# Patient Record
Sex: FEMALE | Race: WHITE | Employment: OTHER | URBAN - METROPOLITAN AREA
[De-identification: names, ages, dates, MRNs, and addresses within clinical notes are randomized per-mention and may not be internally consistent; named-entity substitution may affect disease eponyms.]

---

## 2017-03-16 RX ORDER — ATENOLOL 50 MG/1
50 TABLET ORAL DAILY
Qty: 90 TAB | Refills: 0 | Status: SHIPPED | OUTPATIENT
Start: 2017-03-16 | End: 2017-07-01 | Stop reason: SDUPTHER

## 2017-03-16 RX ORDER — AMLODIPINE BESYLATE 10 MG/1
10 TABLET ORAL DAILY
Qty: 90 TAB | Refills: 0 | Status: SHIPPED | OUTPATIENT
Start: 2017-03-16 | End: 2017-07-01 | Stop reason: SDUPTHER

## 2017-03-16 RX ORDER — ALLOPURINOL 300 MG/1
300 TABLET ORAL DAILY
Qty: 90 TAB | Refills: 0 | Status: SHIPPED | OUTPATIENT
Start: 2017-03-16 | End: 2017-07-01 | Stop reason: SDUPTHER

## 2017-03-16 RX ORDER — VALSARTAN 320 MG/1
320 TABLET ORAL DAILY
Qty: 90 TAB | Refills: 0 | Status: SHIPPED | OUTPATIENT
Start: 2017-03-16 | End: 2017-07-01 | Stop reason: SDUPTHER

## 2017-03-16 NOTE — TELEPHONE ENCOUNTER
From: Kai Green  To: Roberta Lopez MD  Sent: 3/15/2017 10:04 AM EDT  Subject: Medication Renewal Request    Original authorizing provider: MD Kai Mckeon would like a refill of the following medications:  atenolol (TENORMIN) 50 mg tablet [Reina Ardon MD]  amLODIPine (NORVASC) 10 mg tablet [Reina Ardon MD]  allopurinol (ZYLOPRIM) 300 mg tablet [Reina Ardon MD]  valsartan (DIOVAN) 320 mg tablet Roberta Lopez MD]    Preferred pharmacy: [facility] [phone]    Comment:  My mail pharmacy has changed to : [address] [place] [address] ID [id number] [phone] I do not know what the drug indomethacin listed is for. I have never taken it or been told I need it. Please explain.

## 2017-03-16 NOTE — TELEPHONE ENCOUNTER
Requested Prescriptions     Pending Prescriptions Disp Refills    atenolol (TENORMIN) 50 mg tablet 90 Tab 3     Sig: Take 1 Tab by mouth daily.  amLODIPine (NORVASC) 10 mg tablet 90 Tab 3     Sig: Take 1 Tab by mouth daily.  allopurinol (ZYLOPRIM) 300 mg tablet 90 Tab 3     Sig: Take 1 Tab by mouth daily.  valsartan (DIOVAN) 320 mg tablet 90 Tab 3     Sig: Take 1 Tab by mouth daily.      Last visit 06/27/17  No new appt set

## 2017-03-17 NOTE — TELEPHONE ENCOUNTER
Medication filled for 3 mo. Please advise her to schedule a f/u appt with new provider. (overdue for visit).

## 2017-06-07 ENCOUNTER — HOSPITAL ENCOUNTER (OUTPATIENT)
Dept: LAB | Age: 72
Discharge: HOME | End: 2017-06-07
Payer: MEDICARE

## 2017-06-07 ENCOUNTER — OFFICE VISIT (OUTPATIENT)
Dept: INTERNAL MEDICINE CLINIC | Age: 72
End: 2017-06-07

## 2017-06-07 VITALS
HEIGHT: 62 IN | HEART RATE: 55 BPM | TEMPERATURE: 98.6 F | RESPIRATION RATE: 16 BRPM | WEIGHT: 206 LBS | BODY MASS INDEX: 37.91 KG/M2 | DIASTOLIC BLOOD PRESSURE: 72 MMHG | SYSTOLIC BLOOD PRESSURE: 120 MMHG | OXYGEN SATURATION: 96 %

## 2017-06-07 DIAGNOSIS — M1A.0790 CHRONIC GOUT OF FOOT, UNSPECIFIED CAUSE, UNSPECIFIED LATERALITY: ICD-10-CM

## 2017-06-07 DIAGNOSIS — I10 BENIGN ESSENTIAL HYPERTENSION: ICD-10-CM

## 2017-06-07 DIAGNOSIS — R06.09 DYSPNEA ON EXERTION: Primary | ICD-10-CM

## 2017-06-07 DIAGNOSIS — E66.9 OBESITY (BMI 30-39.9): ICD-10-CM

## 2017-06-07 DIAGNOSIS — Z00.00 WELL ADULT EXAM: ICD-10-CM

## 2017-06-07 DIAGNOSIS — E78.1 HYPERTRIGLYCERIDEMIA: ICD-10-CM

## 2017-06-07 PROCEDURE — 36415 COLL VENOUS BLD VENIPUNCTURE: CPT

## 2017-06-07 PROCEDURE — 85025 COMPLETE CBC W/AUTO DIFF WBC: CPT

## 2017-06-07 PROCEDURE — 81003 URINALYSIS AUTO W/O SCOPE: CPT

## 2017-06-07 PROCEDURE — 80053 COMPREHEN METABOLIC PANEL: CPT

## 2017-06-07 PROCEDURE — 80061 LIPID PANEL: CPT

## 2017-06-07 PROCEDURE — 84550 ASSAY OF BLOOD/URIC ACID: CPT

## 2017-06-07 PROCEDURE — 84443 ASSAY THYROID STIM HORMONE: CPT

## 2017-06-07 NOTE — MR AVS SNAPSHOT
Visit Information Date & Time Provider Department Dept. Phone Encounter #  
 6/7/2017  8:15 AM Aracely Ramirez MD [place] [id number] [facility] [phone] Follow-up Instructions Return in about 4 months (around 10/7/2017) for F/U HTN. Upcoming Health Maintenance Date Due OSTEOPOROSIS SCREENING (DEXA) 12/4/2009 MEDICARE YEARLY EXAM 6/28/2017 INFLUENZA AGE 9 TO ADULT 8/1/2017 BREAST CANCER SCRN MAMMOGRAM 6/17/2018 GLAUCOMA SCREENING Q2Y 3/22/2019 COLONOSCOPY 1/1/2021 DTaP/Tdap/Td series (2 - Td) 1/9/2023 Allergies as of 6/7/2017  Review Complete On: 6/7/2017 By: Aracely Ramirez MD  
  
 Severity Noted Reaction Type Reactions Ace Inhibitors  09/28/2011    Cough Penicillins  10/14/2011    Hives Seafood [Shellfish Containing Products]  10/14/2011    Other (comments) All seafood aggravates gout Shellfish Containing Products  12/05/2011   Side Effect Other (comments)  
 gout Terazosin  09/28/2011    Unknown (comments) Incont Current Immunizations  Reviewed on 1/7/2013 Name Date Influenza Vaccine 10/1/2013 Influenza Vaccine Split 10/7/2012 Pneumococcal Polysaccharide (PPSV-23) 1/7/2013 TD Vaccine 8/1/2002 Tdap 1/9/2013 Zoster Vaccine, Live 11/21/2012 Not reviewed this visit You Were Diagnosed With   
  
 Codes Comments Dyspnea on exertion    -  Primary ICD-10-CM: R06.09 
ICD-9-CM: 786.09 Benign essential hypertension     ICD-10-CM: I10 
ICD-9-CM: 461. 1 Chronic gout of foot, unspecified cause, unspecified laterality     ICD-10-CM: M1A.0790 ICD-9-CM: 274.02 Hypertriglyceridemia     ICD-10-CM: E78.1 ICD-9-CM: 272.1 Obesity (BMI 30-39. 9)     ICD-10-CM: E66.9 ICD-9-CM: 278.00 Well adult exam     ICD-10-CM: Z00.00 ICD-9-CM: V70.0 Vitals BP Pulse Temp Resp Height(growth percentile) Weight(growth percentile) 120/72 (BP 1 Location: Left arm, BP Patient Position: Sitting) (!) 55 98.6 °F (37 °C) (Oral) 16 5' 2\" (1.575 m) 206 lb (93.4 kg) SpO2 BMI OB Status Smoking Status 96% 37.68 kg/m2 Postmenopausal Never Smoker Vitals History BMI and BSA Data Body Mass Index Body Surface Area  
 37.68 kg/m 2 2.02 m 2 Preferred Pharmacy Pharmacy Name Phone [facility] [address] [place] [address] [address] [place] [address] [phone] Your Updated Medication List  
  
   
This list is accurate as of: 6/7/17  8:41 AM.  Always use your most recent med list. ADVIL PO Take  by mouth. Takes occasionally  
  
 allopurinol 300 mg tablet Commonly known as:  Matthew Rodríguez Take 1 Tab by mouth daily. amLODIPine 10 mg tablet Commonly known as:  Eneida Medici Take 1 Tab by mouth daily. atenolol 50 mg tablet Commonly known as:  TENORMIN Take 1 Tab by mouth daily. FISH OIL 1,000 mg Cap Generic drug:  omega-3 fatty acids-vitamin e Take 2 Caps by mouth.  
  
 valsartan 320 mg tablet Commonly known as:  DIOVAN Take 1 Tab by mouth daily. VITAMIN D3 PO Take  by mouth. We Performed the Following CBC WITH AUTOMATED DIFF [41622 CPT(R)] LIPID PANEL [03932 CPT(R)] METABOLIC PANEL, COMPREHENSIVE [90728 CPT(R)] TSH REFLEX TO T4 [[id number] Custom] URIC ACID [id number] CPT(R)] URINALYSIS W/ RFLX MICROSCOPIC [71686 CPT(R)] Follow-up Instructions Return in about 4 months (around 10/7/2017) for F/U HTN. To-Do List   
 06/08/2017 Imaging:  NM CARDIAC SPECT W STRS/REST MULT Patient Instructions Follow a low salt, no concentrated sweets, calorie controlled diet. Stay physically active. Try to lose 15 pounds over the next 4 months. Have a stress test analysis. Have fasting blood analysis today. Continue your current medications. DASH Diet: Care Instructions Your Care Instructions The DASH diet is an eating plan that can help lower your blood pressure. DASH stands for Dietary Approaches to Stop Hypertension. Hypertension is high blood pressure. The DASH diet focuses on eating foods that are high in calcium, potassium, and magnesium. These nutrients can lower blood pressure. The foods that are highest in these nutrients are fruits, vegetables, low-fat dairy products, nuts, seeds, and legumes. But taking calcium, potassium, and magnesium supplements instead of eating foods that are high in those nutrients does not have the same effect. The DASH diet also includes whole grains, fish, and poultry. The DASH diet is one of several lifestyle changes your doctor may recommend to lower your high blood pressure. Your doctor may also want you to decrease the amount of sodium in your diet. Lowering sodium while following the DASH diet can lower blood pressure even further than just the DASH diet alone. Follow-up care is a key part of your treatment and safety. Be sure to make and go to all appointments, and call your doctor if you are having problems. It's also a good idea to know your test results and keep a list of the medicines you take. How can you care for yourself at home? Following the DASH diet · Eat 4 to 5 servings of fruit each day. A serving is 1 medium-sized piece of fruit, ½ cup chopped or canned fruit, 1/4 cup dried fruit, or 4 ounces (½ cup) of fruit juice. Choose fruit more often than fruit juice. · Eat 4 to 5 servings of vegetables each day. A serving is 1 cup of lettuce or raw leafy vegetables, ½ cup of chopped or cooked vegetables, or 4 ounces (½ cup) of vegetable juice. Choose vegetables more often than vegetable juice. · Get 2 to 3 servings of low-fat and fat-free dairy each day. A serving is 8 ounces of milk, 1 cup of yogurt, or 1 ½ ounces of cheese. · Eat 6 to 8 servings of grains each day.  A serving is 1 slice of bread, 1 ounce of dry cereal, or ½ cup of cooked rice, pasta, or cooked cereal. Try to choose whole-grain products as much as possible. · Limit lean meat, poultry, and fish to 2 servings each day. A serving is 3 ounces, about the size of a deck of cards. · Eat 4 to 5 servings of nuts, seeds, and legumes (cooked dried beans, lentils, and split peas) each week. A serving is 1/3 cup of nuts, 2 tablespoons of seeds, or ½ cup of cooked beans or peas. · Limit fats and oils to 2 to 3 servings each day. A serving is 1 teaspoon of vegetable oil or 2 tablespoons of salad dressing. · Limit sweets and added sugars to 5 servings or less a week. A serving is 1 tablespoon jelly or jam, ½ cup sorbet, or 1 cup of lemonade. · Eat less than 2,300 milligrams (mg) of sodium a day. If you limit your sodium to 1,500 mg a day, you can lower your blood pressure even more. Tips for success · Start small. Do not try to make dramatic changes to your diet all at once. You might feel that you are missing out on your favorite foods and then be more likely to not follow the plan. Make small changes, and stick with them. Once those changes become habit, add a few more changes. · Try some of the following: ¨ Make it a goal to eat a fruit or vegetable at every meal and at snacks. This will make it easy to get the recommended amount of fruits and vegetables each day. ¨ Try yogurt topped with fruit and nuts for a snack or healthy dessert. ¨ Add lettuce, tomato, cucumber, and onion to sandwiches. ¨ Combine a ready-made pizza crust with low-fat mozzarella cheese and lots of vegetable toppings. Try using tomatoes, squash, spinach, broccoli, carrots, cauliflower, and onions. ¨ Have a variety of cut-up vegetables with a low-fat dip as an appetizer instead of chips and dip. ¨ Sprinkle sunflower seeds or chopped almonds over salads. Or try adding chopped walnuts or almonds to cooked vegetables. ¨ Try some vegetarian meals using beans and peas. Add garbanzo or kidney beans to salads. Make burritos and tacos with mashed carter beans or black beans. Where can you learn more? Go to http://morenita-elijah.info/. Enter F360 in the search box to learn more about \"DASH Diet: Care Instructions. \" Current as of: March 23, 2016 Content Version: 11.2 © 2791-2172 [employer]. Care instructions adapted under license by [employer] (which disclaims liability or warranty for this information). If you have questions about a medical condition or this instruction, always ask your healthcare professional. [address] any warranty or liability for your use of this information. Introducing [facility]! Dear Nely Herzog: 
Thank you for requesting a Moneytree account. Our records indicate that you already have an active Moneytree account. You can access your account anytime at [email or web address]. Onepager/[employer] Did you know that you can access your hospital and ER discharge instructions at any time in Moneytree? You can also review all of your test results from your hospital stay or ER visit. Additional Information If you have questions, please visit the Frequently Asked Questions section of the Moneytree website at [email or web address] [employer]/. Remember, Moneytree is NOT to be used for urgent needs. For medical emergencies, dial 911. Now available from your iPhone and Android! Please provide this summary of care documentation to your next provider. Your primary care clinician is listed as Anabela Zapien. If you have any questions after today's visit, please call [phone].

## 2017-06-07 NOTE — PROGRESS NOTES
Chief Complaint   [facility]   [facility]     Reviewed record in preparation for visit and have obtained necessary documentation. Identified pt with two pt identifiers(name and ). Health Maintenance Due   Topic    OSTEOPOROSIS SCREENING (DEXA)          Chief Complaint   Patient presents with   [facility] Readings from Last 3 Encounters:   17 206 lb (93.4 kg)   16 214 lb 11.2 oz (97.4 kg)   08/24/15 214 lb (97.1 kg)     Temp Readings from Last 3 Encounters:   17 98.6 °F (37 °C) (Oral)   16 97.1 °F (36.2 °C) (Oral)   08/24/15 96.3 °F (35.7 °C) (Oral)     BP Readings from Last 3 Encounters:   17 120/72   16 120/70   08/24/15 130/70     Pulse Readings from Last 3 Encounters:   17 (!) 55   16 62   08/24/15 (!) 53           Learning Assessment:  :     Learning Assessment 6/3/2015 3/12/2014   PRIMARY LEARNER Patient Patient   HIGHEST LEVEL OF EDUCATION - PRIMARY LEARNER  4 YEARS OF COLLEGE 4 YEARS OF COLLEGE   BARRIERS PRIMARY LEARNER NONE NONE   CO-LEARNER CAREGIVER No No   PRIMARY LANGUAGE ENGLISH ENGLISH    NEED No No   LEARNER PREFERENCE PRIMARY DEMONSTRATION READING   LEARNING SPECIAL TOPICS no no   ANSWERED BY patient patient   RELATIONSHIP SELF SELF       Depression Screening:  :     PHQ over the last two weeks 2017   Little interest or pleasure in doing things Not at all   Feeling down, depressed or hopeless Not at all   Total Score PHQ 2 0       Fall Risk Assessment:  :     Fall Risk Assessment, last 12 mths 2017   Able to walk? Yes   Fall in past 12 months? No       Abuse Screening:  :     Abuse Screening Questionnaire 6/3/2015 3/12/2014   Do you ever feel afraid of your partner? N N   Are you in a relationship with someone who physically or mentally threatens you? N N   Is it safe for you to go home?  Y Y       Coordination of Care Questionnaire:  :     1) Have you been to an emergency room, urgent care clinic since your last visit? no   Hospitalized since your last visit? no             2) Have you seen or consulted any other health care providers outside of [address] [place] [address] since your last visit? no  (Include any pap smears or colon screenings in this section.)    3) Do you have an Advance Directive on file? no    4) Are you interested in receiving information on Advance Directives? NO      Patient is accompanied by self I have received verbal consent from Sukhjinder Gonzalez to discuss any/all medical information while they are present in the room. Reviewed record  In preparation for visit and have obtained necessary documentation.

## 2017-06-07 NOTE — PROGRESS NOTES
Subjective:     Chief Complaint   Patient presents with   [address]     She  is a 66y.o. year old female who presents for evaluation. New patient to me. Has a history of HTN / gout / HLD / hypertriglyceridemia and fasting hyperglycemia. FH:  CAD / breast cancer  Concerns:  · Gets winded / tired with minor exertion      Historical Data    Past Medical History:   Diagnosis Date    Contact dermatitis and other eczema, due to unspecified cause     rosacea; AK's    Gout     HTN (hypertension)     Hypercholesterolemia     Hypertension     Other ill-defined conditions(799.89)     gout    Pre-diabetes         Past Surgical History:   Procedure Laterality Date    ENDOSCOPY, COLON, DIAGNOSTIC      HX COLONOSCOPY  2012    neg - next 2022    HX HEENT  1949    T & A    HX ORTHOPAEDIC  2000    bilateral hammertoe surgery    HX ORTHOPAEDIC  2002    r rotator cuff repair        Outpatient Encounter Prescriptions as of 6/7/2017   Medication Sig Dispense Refill    CHOLECALCIFEROL, VITAMIN D3, (VITAMIN D3 PO) Take  by mouth.  atenolol (TENORMIN) 50 mg tablet Take 1 Tab by mouth daily. 90 Tab 0    amLODIPine (NORVASC) 10 mg tablet Take 1 Tab by mouth daily. 90 Tab 0    allopurinol (ZYLOPRIM) 300 mg tablet Take 1 Tab by mouth daily. 90 Tab 0    valsartan (DIOVAN) 320 mg tablet Take 1 Tab by mouth daily. 90 Tab 0    omega-3 fatty acids-vitamin e (FISH OIL) 1,000 mg cap Take 2 Caps by mouth.  IBUPROFEN (ADVIL PO) Take  by mouth. Takes occasionally       ciprofloxacin HCl (CIPRO) 500 mg tablet Take 1 Tab by mouth two (2) times a day. 6 Tab 0    BIOTIN PO Take  by mouth.  RED YEAST RICE PO Take  by mouth.  indomethacin (INDOCIN) 50 mg capsule Take 1 cap TID as needed for gout flare 30 Cap 0    colchicine 0.6 mg tablet Take 2 tabs at onset of symptoms followed by 1 tab 1 hr later. 90 tablet 0    ACETAMINOPHEN/DP-HYDRAM HCL (TYLENOL PM PO) Take  by mouth.  Takes occasionally        No facility-administered encounter medications on file as of 6/7/2017. Allergies   Allergen Reactions    Ace Inhibitors Cough    Penicillins Hives    Seafood [Shellfish Containing Products] Other (comments)     All seafood aggravates gout    Shellfish Containing Products Other (comments)     gout    Terazosin Unknown (comments)     Incont        Social History     Social History    Marital status:      Spouse name: N/A    Number of children: N/A    Years of education: N/A     Occupational History    Not on file. Social History Main Topics    Smoking status: Never Smoker    Smokeless tobacco: Never Used    Alcohol use No    Drug use: No    Sexual activity: Yes     Partners: Male     Other Topics Concern    Caffeine Concern No     avoids caffeine     Social History Narrative    ** Merged History Encounter **             Review of Systems  A comprehensive review of systems was negative except for that written in the HPI. Objective:     Vitals:    06/07/17 0819   BP: 120/72   Pulse: (!) 55   Resp: 16   Temp: 98.6 °F (37 °C)   TempSrc: Oral   SpO2: 96%   Weight: 206 lb (93.4 kg)   Height: 5' 2\" (1.575 m)     Pleasant female in no acute distress. Neck: Supple. No carotid bruits. Cardiac: RRR without murmurs, gallops or rubs. Lungs: Clear to auscultation. Abdomen: Benign. Neuro: Non focal.  Extremities: 1+ edema LLE > RLE    ASSESSMENT / PLAN:   1. Dyspnea on exertion  · Has sufficient cardiac risk factors to justify stress testing.  - NM CARDIAC SPECT W STRS/REST MULT; Future    2. Benign essential hypertension  · Low salt diet. · Continue atenolol, amlodipine and valsartan. - NM CARDIAC SPECT W STRS/REST MULT; Future  - METABOLIC PANEL, COMPREHENSIVE    3. Chronic gout of foot, unspecified cause, unspecified laterality  · Continue allopurinol.  - URIC ACID    4. Hypertriglyceridemia  · Continue fish oil.  - NM CARDIAC SPECT W STRS/REST MULT; Future    5. Obesity (BMI 30-39. 9)  I have reviewed/discussed the above normal BMI with the patient. I have recommended the following interventions: dietary management education, guidance, and counseling . [facility] 6. Well adult exam    - CBC WITH AUTOMATED DIFF  - LIPID PANEL  - TSH REFLEX TO T4  - METABOLIC PANEL, COMPREHENSIVE  - URINALYSIS W/ RFLX MICROSCOPIC  - URIC ACID    Patient Instructions   Follow a low salt, no concentrated sweets, calorie controlled diet. Stay physically active. Try to lose 15 pounds over the next 4 months. Have a stress test analysis. Have fasting blood analysis today. Continue your current medications. DASH Diet: Care Instructions  Your Care Instructions  The DASH diet is an eating plan that can help lower your blood pressure. DASH stands for Dietary Approaches to Stop Hypertension. Hypertension is high blood pressure. The DASH diet focuses on eating foods that are high in calcium, potassium, and magnesium. These nutrients can lower blood pressure. The foods that are highest in these nutrients are fruits, vegetables, low-fat dairy products, nuts, seeds, and legumes. But taking calcium, potassium, and magnesium supplements instead of eating foods that are high in those nutrients does not have the same effect. The DASH diet also includes whole grains, fish, and poultry. The DASH diet is one of several lifestyle changes your doctor may recommend to lower your high blood pressure. Your doctor may also want you to decrease the amount of sodium in your diet. Lowering sodium while following the DASH diet can lower blood pressure even further than just the DASH diet alone. Follow-up care is a key part of your treatment and safety. Be sure to make and go to all appointments, and call your doctor if you are having problems. It's also a good idea to know your test results and keep a list of the medicines you take. How can you care for yourself at home?   Following the DASH diet  · Eat 4 to 5 servings of fruit each day. A serving is 1 medium-sized piece of fruit, ½ cup chopped or canned fruit, 1/4 cup dried fruit, or 4 ounces (½ cup) of fruit juice. Choose fruit more often than fruit juice. · Eat 4 to 5 servings of vegetables each day. A serving is 1 cup of lettuce or raw leafy vegetables, ½ cup of chopped or cooked vegetables, or 4 ounces (½ cup) of vegetable juice. Choose vegetables more often than vegetable juice. · Get 2 to 3 servings of low-fat and fat-free dairy each day. A serving is 8 ounces of milk, 1 cup of yogurt, or 1 ½ ounces of cheese. · Eat 6 to 8 servings of grains each day. A serving is 1 slice of bread, 1 ounce of dry cereal, or ½ cup of cooked rice, pasta, or cooked cereal. Try to choose whole-grain products as much as possible. · Limit lean meat, poultry, and fish to 2 servings each day. A serving is 3 ounces, about the size of a deck of cards. · Eat 4 to 5 servings of nuts, seeds, and legumes (cooked dried beans, lentils, and split peas) each week. A serving is 1/3 cup of nuts, 2 tablespoons of seeds, or ½ cup of cooked beans or peas. · Limit fats and oils to 2 to 3 servings each day. A serving is 1 teaspoon of vegetable oil or 2 tablespoons of salad dressing. · Limit sweets and added sugars to 5 servings or less a week. A serving is 1 tablespoon jelly or jam, ½ cup sorbet, or 1 cup of lemonade. · Eat less than 2,300 milligrams (mg) of sodium a day. If you limit your sodium to 1,500 mg a day, you can lower your blood pressure even more. Tips for success  · Start small. Do not try to make dramatic changes to your diet all at once. You might feel that you are missing out on your favorite foods and then be more likely to not follow the plan. Make small changes, and stick with them. Once those changes become habit, add a few more changes. · Try some of the following:  ¨ Make it a goal to eat a fruit or vegetable at every meal and at snacks.  This will make it easy to get the recommended amount of fruits and vegetables each day. ¨ Try yogurt topped with fruit and nuts for a snack or healthy dessert. ¨ Add lettuce, tomato, cucumber, and onion to sandwiches. ¨ Combine a ready-made pizza crust with low-fat mozzarella cheese and lots of vegetable toppings. Try using tomatoes, squash, spinach, broccoli, carrots, cauliflower, and onions. ¨ Have a variety of cut-up vegetables with a low-fat dip as an appetizer instead of chips and dip. ¨ Sprinkle sunflower seeds or chopped almonds over salads. Or try adding chopped walnuts or almonds to cooked vegetables. ¨ Try some vegetarian meals using beans and peas. Add garbanzo or kidney beans to salads. Make burritos and tacos with mashed carter beans or black beans. Where can you learn more? Go to http://morenitaInvidioelijah.info/. Enter H503 in the search box to learn more about \"DASH Diet: Care Instructions. \"  Current as of: March 23, 2016  Content Version: 11.2  © 8779-1771 [employer]. Care instructions adapted under license by [employer] (which disclaims liability or warranty for this information). If you have questions about a medical condition or this instruction, always ask your healthcare professional. [place] [address] any warranty or liability for your use of this information. Follow-up Disposition:  Return in about 4 months (around 10/7/2017) for F/U HTN. Advised her to call back or return to office if symptoms worsen/change/persist.  Discussed expected course/resolution/complications of diagnosis in detail with patient. Medication risks/benefits/costs/interactions/alternatives discussed with patient. She was given an after visit summary which includes diagnoses, current medications, & vitals. She expressed understanding with the diagnosis and plan.

## 2017-06-07 NOTE — PATIENT INSTRUCTIONS
Follow a low salt, no concentrated sweets, calorie controlled diet. Stay physically active. Try to lose 15 pounds over the next 4 months. Have a stress test analysis. Have fasting blood analysis today. Continue your current medications. DASH Diet: Care Instructions  Your Care Instructions  The DASH diet is an eating plan that can help lower your blood pressure. DASH stands for Dietary Approaches to Stop Hypertension. Hypertension is high blood pressure. The DASH diet focuses on eating foods that are high in calcium, potassium, and magnesium. These nutrients can lower blood pressure. The foods that are highest in these nutrients are fruits, vegetables, low-fat dairy products, nuts, seeds, and legumes. But taking calcium, potassium, and magnesium supplements instead of eating foods that are high in those nutrients does not have the same effect. The DASH diet also includes whole grains, fish, and poultry. The DASH diet is one of several lifestyle changes your doctor may recommend to lower your high blood pressure. Your doctor may also want you to decrease the amount of sodium in your diet. Lowering sodium while following the DASH diet can lower blood pressure even further than just the DASH diet alone. Follow-up care is a key part of your treatment and safety. Be sure to make and go to all appointments, and call your doctor if you are having problems. It's also a good idea to know your test results and keep a list of the medicines you take. How can you care for yourself at home? Following the DASH diet  · Eat 4 to 5 servings of fruit each day. A serving is 1 medium-sized piece of fruit, ½ cup chopped or canned fruit, 1/4 cup dried fruit, or 4 ounces (½ cup) of fruit juice. Choose fruit more often than fruit juice. · Eat 4 to 5 servings of vegetables each day. A serving is 1 cup of lettuce or raw leafy vegetables, ½ cup of chopped or cooked vegetables, or 4 ounces (½ cup) of vegetable juice.  Choose vegetables more often than vegetable juice. · Get 2 to 3 servings of low-fat and fat-free dairy each day. A serving is 8 ounces of milk, 1 cup of yogurt, or 1 ½ ounces of cheese. · Eat 6 to 8 servings of grains each day. A serving is 1 slice of bread, 1 ounce of dry cereal, or ½ cup of cooked rice, pasta, or cooked cereal. Try to choose whole-grain products as much as possible. · Limit lean meat, poultry, and fish to 2 servings each day. A serving is 3 ounces, about the size of a deck of cards. · Eat 4 to 5 servings of nuts, seeds, and legumes (cooked dried beans, lentils, and split peas) each week. A serving is 1/3 cup of nuts, 2 tablespoons of seeds, or ½ cup of cooked beans or peas. · Limit fats and oils to 2 to 3 servings each day. A serving is 1 teaspoon of vegetable oil or 2 tablespoons of salad dressing. · Limit sweets and added sugars to 5 servings or less a week. A serving is 1 tablespoon jelly or jam, ½ cup sorbet, or 1 cup of lemonade. · Eat less than 2,300 milligrams (mg) of sodium a day. If you limit your sodium to 1,500 mg a day, you can lower your blood pressure even more. Tips for success  · Start small. Do not try to make dramatic changes to your diet all at once. You might feel that you are missing out on your favorite foods and then be more likely to not follow the plan. Make small changes, and stick with them. Once those changes become habit, add a few more changes. · Try some of the following:  ¨ Make it a goal to eat a fruit or vegetable at every meal and at snacks. This will make it easy to get the recommended amount of fruits and vegetables each day. ¨ Try yogurt topped with fruit and nuts for a snack or healthy dessert. ¨ Add lettuce, tomato, cucumber, and onion to sandwiches. ¨ Combine a ready-made pizza crust with low-fat mozzarella cheese and lots of vegetable toppings. Try using tomatoes, squash, spinach, broccoli, carrots, cauliflower, and onions.   ¨ Have a variety of cut-up vegetables with a low-fat dip as an appetizer instead of chips and dip. ¨ Sprinkle sunflower seeds or chopped almonds over salads. Or try adding chopped walnuts or almonds to cooked vegetables. ¨ Try some vegetarian meals using beans and peas. Add garbanzo or kidney beans to salads. Make burritos and tacos with mashed carter beans or black beans. Where can you learn more? Go to http://morenita-elijah.info/. Enter G055 in the search box to learn more about \"DASH Diet: Care Instructions. \"  Current as of: March 23, 2016  Content Version: 11.2  © 1950-6512 [employer]. Care instructions adapted under license by [employer] (which disclaims liability or warranty for this information). If you have questions about a medical condition or this instruction, always ask your healthcare professional. [facility] any warranty or liability for your use of this information.

## 2017-06-08 LAB
ALBUMIN SERPL-MCNC: 3.9 G/DL (ref 3.5–4.8)
ALBUMIN/GLOB SERPL: 1.6 {RATIO} (ref 1.2–2.2)
ALP SERPL-CCNC: 78 IU/L (ref 39–117)
ALT SERPL-CCNC: 8 IU/L (ref 0–32)
APPEARANCE UR: CLEAR
AST SERPL-CCNC: 19 IU/L (ref 0–40)
BASOPHILS # BLD AUTO: 0 X10E3/UL (ref 0–0.2)
BASOPHILS NFR BLD AUTO: 1 %
BILIRUB SERPL-MCNC: 0.4 MG/DL (ref 0–1.2)
BILIRUB UR QL STRIP: NEGATIVE
BUN SERPL-MCNC: 20 MG/DL (ref 8–27)
BUN/CREAT SERPL: 22 (ref 12–28)
CALCIUM SERPL-MCNC: 9.6 MG/DL (ref 8.7–10.3)
CHLORIDE SERPL-SCNC: 101 MMOL/L (ref 96–106)
CHOLEST SERPL-MCNC: 125 MG/DL (ref 100–199)
CO2 SERPL-SCNC: 20 MMOL/L (ref 18–29)
COLOR UR: YELLOW
CREAT SERPL-MCNC: 0.92 MG/DL (ref 0.57–1)
EOSINOPHIL # BLD AUTO: 0.3 X10E3/UL (ref 0–0.4)
EOSINOPHIL NFR BLD AUTO: 7 %
ERYTHROCYTE [DISTWIDTH] IN BLOOD BY AUTOMATED COUNT: 17.7 % (ref 12.3–15.4)
GLOBULIN SER CALC-MCNC: 2.4 G/DL (ref 1.5–4.5)
GLUCOSE SERPL-MCNC: 119 MG/DL (ref 65–99)
GLUCOSE UR QL: NEGATIVE
HCT VFR BLD AUTO: 28.1 % (ref 34–46.6)
HDLC SERPL-MCNC: 27 MG/DL
HGB BLD-MCNC: 8.4 G/DL (ref 11.1–15.9)
HGB UR QL STRIP: NEGATIVE
IMM GRANULOCYTES # BLD: 0 X10E3/UL (ref 0–0.1)
IMM GRANULOCYTES NFR BLD: 0 %
INTERPRETATION, 910389: NORMAL
KETONES UR QL STRIP: NEGATIVE
LDLC SERPL CALC-MCNC: 69 MG/DL (ref 0–99)
LEUKOCYTE ESTERASE UR QL STRIP: NEGATIVE
LYMPHOCYTES # BLD AUTO: 1.3 X10E3/UL (ref 0.7–3.1)
LYMPHOCYTES NFR BLD AUTO: 26 %
MCH RBC QN AUTO: 21.9 PG (ref 26.6–33)
MCHC RBC AUTO-ENTMCNC: 29.9 G/DL (ref 31.5–35.7)
MCV RBC AUTO: 73 FL (ref 79–97)
MICRO URNS: NORMAL
MONOCYTES # BLD AUTO: 0.3 X10E3/UL (ref 0.1–0.9)
MONOCYTES NFR BLD AUTO: 7 %
NEUTROPHILS # BLD AUTO: 2.9 X10E3/UL (ref 1.4–7)
NEUTROPHILS NFR BLD AUTO: 59 %
NITRITE UR QL STRIP: NEGATIVE
PH UR STRIP: 6 [PH] (ref 5–7.5)
PLATELET # BLD AUTO: 253 X10E3/UL (ref 150–379)
POTASSIUM SERPL-SCNC: 4.9 MMOL/L (ref 3.5–5.2)
PROT SERPL-MCNC: 6.3 G/DL (ref 6–8.5)
PROT UR QL STRIP: NEGATIVE
RBC # BLD AUTO: 3.84 X10E6/UL (ref 3.77–5.28)
SODIUM SERPL-SCNC: 141 MMOL/L (ref 134–144)
SP GR UR: 1.02 (ref 1–1.03)
TRIGL SERPL-MCNC: 146 MG/DL (ref 0–149)
TSH SERPL DL<=0.005 MIU/L-ACNC: 2.07 UIU/ML (ref 0.45–4.5)
URATE SERPL-MCNC: 3.6 MG/DL (ref 2.5–7.1)
UROBILINOGEN UR STRIP-MCNC: 0.2 MG/DL (ref 0.2–1)
VLDLC SERPL CALC-MCNC: 29 MG/DL (ref 5–40)
WBC # BLD AUTO: 4.9 X10E3/UL (ref 3.4–10.8)

## 2017-06-12 ENCOUNTER — DOCUMENTATION ONLY (OUTPATIENT)
Dept: INTERNAL MEDICINE CLINIC | Age: 72
End: 2017-06-12

## 2017-06-12 DIAGNOSIS — D50.9 IRON DEFICIENCY ANEMIA, UNSPECIFIED IRON DEFICIENCY ANEMIA TYPE: Primary | ICD-10-CM

## 2017-06-19 ENCOUNTER — DOCUMENTATION ONLY (OUTPATIENT)
Dept: INTERNAL MEDICINE CLINIC | Age: 72
End: 2017-06-19

## 2017-06-19 DIAGNOSIS — D50.9 IRON DEFICIENCY ANEMIA, UNSPECIFIED IRON DEFICIENCY ANEMIA TYPE: Primary | ICD-10-CM

## 2017-06-19 LAB — HEMOCCULT STL QL IA: POSITIVE

## 2017-06-19 NOTE — PROGRESS NOTES
Attempted to place call to patient to notify her of (+) FIT test and to inform her that I am sending her a referral for GI. No answer.   Dr Frank Almendarez

## 2017-06-22 ENCOUNTER — DOCUMENTATION ONLY (OUTPATIENT)
Dept: INTERNAL MEDICINE CLINIC | Age: 72
End: 2017-06-22

## 2017-06-22 NOTE — PROGRESS NOTES
Patient has been referred to Dr Ocampo's office and a text message was sent to provider describing urgency and asking his office to contact patient. Tried to contact patient but no answer. Message left for her.   Dr Celine Cullen

## 2017-07-03 RX ORDER — VALSARTAN 320 MG/1
320 TABLET ORAL DAILY
Qty: 90 TAB | Refills: 2 | Status: SHIPPED | OUTPATIENT
Start: 2017-07-03 | End: 2017-08-28

## 2017-07-03 RX ORDER — ATENOLOL 50 MG/1
50 TABLET ORAL DAILY
Qty: 90 TAB | Refills: 2 | Status: SHIPPED | OUTPATIENT
Start: 2017-07-03 | End: 2017-08-28 | Stop reason: ALTCHOICE

## 2017-07-03 RX ORDER — AMLODIPINE BESYLATE 10 MG/1
10 TABLET ORAL DAILY
Qty: 90 TAB | Refills: 2 | Status: SHIPPED | OUTPATIENT
Start: 2017-07-03 | End: 2017-08-28

## 2017-07-03 RX ORDER — ALLOPURINOL 300 MG/1
300 TABLET ORAL DAILY
Qty: 90 TAB | Refills: 2 | Status: SHIPPED | OUTPATIENT
Start: 2017-07-03 | End: 2018-03-21 | Stop reason: SDUPTHER

## 2017-07-03 NOTE — TELEPHONE ENCOUNTER
From: Divine Pascual  To: Gris Morton MD  Sent: 7/1/2017 1:00 PM EDT  Subject: Medication Renewal Request    Original authorizing provider:  MD Divine Forde would like a refill of the following medications:  atenolol (TENORMIN) 50 mg tablet [Reina Ardon MD]  amLODIPine (NORVASC) 10 mg tablet [Reina Ardon MD]  allopurinol (ZYLOPRIM) 300 mg tablet [Reina Ardon MD]  valsartan (DIOVAN) 320 mg tablet Gris Morton MD]    Preferred pharmacy: [address], [address]    Comment:

## 2017-07-03 NOTE — TELEPHONE ENCOUNTER
Last office visit- 6/7/17  Next office visit- n/a  Last Refill-3/16/17    Requested Prescriptions     Pending Prescriptions Disp Refills    atenolol (TENORMIN) 50 mg tablet 90 Tab 0     Sig: Take 1 Tab by mouth daily.  amLODIPine (NORVASC) 10 mg tablet 90 Tab 0     Sig: Take 1 Tab by mouth daily.  allopurinol (ZYLOPRIM) 300 mg tablet 90 Tab 0     Sig: Take 1 Tab by mouth daily.  valsartan (DIOVAN) 320 mg tablet 90 Tab 0     Sig: Take 1 Tab by mouth daily.

## 2017-07-21 ENCOUNTER — HOSPITAL ENCOUNTER (OUTPATIENT)
Age: 72
Setting detail: OUTPATIENT SURGERY
Discharge: HOME | End: 2017-07-21
Attending: SPECIALIST | Admitting: SPECIALIST
Payer: MEDICARE

## 2017-07-21 ENCOUNTER — ANESTHESIA (OUTPATIENT)
Dept: ENDOSCOPY | Age: 72
End: 2017-07-21
Payer: MEDICARE

## 2017-07-21 ENCOUNTER — ANESTHESIA EVENT (OUTPATIENT)
Dept: ENDOSCOPY | Age: 72
End: 2017-07-21
Payer: MEDICARE

## 2017-07-21 VITALS
HEIGHT: 62 IN | SYSTOLIC BLOOD PRESSURE: 128 MMHG | BODY MASS INDEX: 37.17 KG/M2 | HEART RATE: 56 BPM | TEMPERATURE: 97.6 F | WEIGHT: 202 LBS | DIASTOLIC BLOOD PRESSURE: 58 MMHG | RESPIRATION RATE: 24 BRPM | OXYGEN SATURATION: 99 %

## 2017-07-21 LAB
ALBUMIN SERPL BCP-MCNC: 3 G/DL (ref 3.5–5)
ALBUMIN/GLOB SERPL: 0.9 {RATIO} (ref 1.1–2.2)
ALP SERPL-CCNC: 80 U/L (ref 45–117)
ALT SERPL-CCNC: 13 U/L (ref 12–78)
ANION GAP BLD CALC-SCNC: 6 MMOL/L (ref 5–15)
AST SERPL W P-5'-P-CCNC: 16 U/L (ref 15–37)
BASOPHILS # BLD AUTO: 0 K/UL (ref 0–0.1)
BASOPHILS # BLD: 0 % (ref 0–1)
BILIRUB SERPL-MCNC: 0.5 MG/DL (ref 0.2–1)
BUN SERPL-MCNC: 15 MG/DL (ref 6–20)
BUN/CREAT SERPL: 14 (ref 12–20)
CALCIUM SERPL-MCNC: 8.4 MG/DL (ref 8.5–10.1)
CEA SERPL-MCNC: 1.2 NG/ML
CHLORIDE SERPL-SCNC: 105 MMOL/L (ref 97–108)
CO2 SERPL-SCNC: 28 MMOL/L (ref 21–32)
CREAT SERPL-MCNC: 1.08 MG/DL (ref 0.55–1.02)
DIFFERENTIAL METHOD BLD: ABNORMAL
EOSINOPHIL # BLD: 0.2 K/UL (ref 0–0.4)
EOSINOPHIL NFR BLD: 4 % (ref 0–7)
ERYTHROCYTE [DISTWIDTH] IN BLOOD BY AUTOMATED COUNT: 20.1 % (ref 11.5–14.5)
GLOBULIN SER CALC-MCNC: 3.3 G/DL (ref 2–4)
GLUCOSE SERPL-MCNC: 84 MG/DL (ref 65–100)
H PYLORI FROM TISSUE: NEGATIVE
HCT VFR BLD AUTO: 31 % (ref 35–47)
HGB BLD-MCNC: 8.9 G/DL (ref 11.5–16)
KIT LOT NO., HCLOLOT: NORMAL
LYMPHOCYTES # BLD AUTO: 26 % (ref 12–49)
LYMPHOCYTES # BLD: 1.1 K/UL (ref 0.8–3.5)
MCH RBC QN AUTO: 22.4 PG (ref 26–34)
MCHC RBC AUTO-ENTMCNC: 28.7 G/DL (ref 30–36.5)
MCV RBC AUTO: 77.9 FL (ref 80–99)
MONOCYTES # BLD: 0.3 K/UL (ref 0–1)
MONOCYTES NFR BLD AUTO: 8 % (ref 5–13)
NEGATIVE CONTROL: NEGATIVE
NEUTS SEG # BLD: 2.7 K/UL (ref 1.8–8)
NEUTS SEG NFR BLD AUTO: 62 % (ref 32–75)
PLATELET # BLD AUTO: 196 K/UL (ref 150–400)
POSITIVE CONTROL: POSITIVE
POTASSIUM SERPL-SCNC: 4.5 MMOL/L (ref 3.5–5.1)
PROT SERPL-MCNC: 6.3 G/DL (ref 6.4–8.2)
RBC # BLD AUTO: 3.98 M/UL (ref 3.8–5.2)
RBC MORPH BLD: ABNORMAL
SODIUM SERPL-SCNC: 139 MMOL/L (ref 136–145)
WBC # BLD AUTO: 4.3 K/UL (ref 3.6–11)

## 2017-07-21 PROCEDURE — 77030003657 HC NDL SCLER BSC -B: Performed by: SPECIALIST

## 2017-07-21 PROCEDURE — 77030009426 HC FCPS BIOP ENDOSC BSC -B: Performed by: SPECIALIST

## 2017-07-21 PROCEDURE — 36415 COLL VENOUS BLD VENIPUNCTURE: CPT | Performed by: SPECIALIST

## 2017-07-21 PROCEDURE — 74011000250 HC RX REV CODE- 250

## 2017-07-21 PROCEDURE — 74011250636 HC RX REV CODE- 250/636

## 2017-07-21 PROCEDURE — 77030027957 HC TBNG IRR ENDOGTR BUSS -B: Performed by: SPECIALIST

## 2017-07-21 PROCEDURE — 87077 CULTURE AEROBIC IDENTIFY: CPT | Performed by: SPECIALIST

## 2017-07-21 PROCEDURE — 76040000007: Performed by: SPECIALIST

## 2017-07-21 PROCEDURE — 77030013992 HC SNR POLYP ENDOSC BSC -B: Performed by: SPECIALIST

## 2017-07-21 PROCEDURE — 74011250637 HC RX REV CODE- 250/637: Performed by: SPECIALIST

## 2017-07-21 PROCEDURE — 85025 COMPLETE CBC W/AUTO DIFF WBC: CPT | Performed by: SPECIALIST

## 2017-07-21 PROCEDURE — 82378 CARCINOEMBRYONIC ANTIGEN: CPT | Performed by: SPECIALIST

## 2017-07-21 PROCEDURE — 76060000032 HC ANESTHESIA 0.5 TO 1 HR: Performed by: SPECIALIST

## 2017-07-21 PROCEDURE — 80053 COMPREHEN METABOLIC PANEL: CPT | Performed by: SPECIALIST

## 2017-07-21 PROCEDURE — 88305 TISSUE EXAM BY PATHOLOGIST: CPT | Performed by: SPECIALIST

## 2017-07-21 RX ORDER — PROPOFOL 10 MG/ML
INJECTION, EMULSION INTRAVENOUS AS NEEDED
Status: DISCONTINUED | OUTPATIENT
Start: 2017-07-21 | End: 2017-07-21 | Stop reason: HOSPADM

## 2017-07-21 RX ORDER — MIDAZOLAM HYDROCHLORIDE 1 MG/ML
.25-1 INJECTION, SOLUTION INTRAMUSCULAR; INTRAVENOUS
Status: ACTIVE | OUTPATIENT
Start: 2017-07-21 | End: 2017-07-21

## 2017-07-21 RX ORDER — LANOLIN ALCOHOL/MO/W.PET/CERES
325 CREAM (GRAM) TOPICAL DAILY
COMMUNITY
End: 2019-04-25 | Stop reason: ALTCHOICE

## 2017-07-21 RX ORDER — SODIUM CHLORIDE 9 MG/ML
INJECTION, SOLUTION INTRAVENOUS
Status: DISCONTINUED | OUTPATIENT
Start: 2017-07-21 | End: 2017-07-21 | Stop reason: HOSPADM

## 2017-07-21 RX ORDER — LIDOCAINE HYDROCHLORIDE 20 MG/ML
INJECTION, SOLUTION EPIDURAL; INFILTRATION; INTRACAUDAL; PERINEURAL AS NEEDED
Status: DISCONTINUED | OUTPATIENT
Start: 2017-07-21 | End: 2017-07-21 | Stop reason: HOSPADM

## 2017-07-21 RX ORDER — FENTANYL CITRATE 50 UG/ML
200 INJECTION, SOLUTION INTRAMUSCULAR; INTRAVENOUS
Status: ACTIVE | OUTPATIENT
Start: 2017-07-21 | End: 2017-07-21

## 2017-07-21 RX ORDER — NALOXONE HYDROCHLORIDE 0.4 MG/ML
0.4 INJECTION, SOLUTION INTRAMUSCULAR; INTRAVENOUS; SUBCUTANEOUS
Status: ACTIVE | OUTPATIENT
Start: 2017-07-21 | End: 2017-07-21

## 2017-07-21 RX ORDER — LORAZEPAM 2 MG/ML
2 INJECTION INTRAMUSCULAR AS NEEDED
Status: ACTIVE | OUTPATIENT
Start: 2017-07-21 | End: 2017-07-21

## 2017-07-21 RX ORDER — DEXTROMETHORPHAN/PSEUDOEPHED 2.5-7.5/.8
1.2 DROPS ORAL
Status: DISCONTINUED | OUTPATIENT
Start: 2017-07-21 | End: 2017-07-21 | Stop reason: HOSPADM

## 2017-07-21 RX ORDER — SODIUM CHLORIDE 9 MG/ML
50 INJECTION, SOLUTION INTRAVENOUS CONTINUOUS
Status: DISPENSED | OUTPATIENT
Start: 2017-07-21 | End: 2017-07-21

## 2017-07-21 RX ORDER — SODIUM CHLORIDE 0.9 % (FLUSH) 0.9 %
5-10 SYRINGE (ML) INJECTION AS NEEDED
Status: ACTIVE | OUTPATIENT
Start: 2017-07-21 | End: 2017-07-21

## 2017-07-21 RX ORDER — SODIUM CHLORIDE 0.9 % (FLUSH) 0.9 %
5-10 SYRINGE (ML) INJECTION EVERY 8 HOURS
Status: DISCONTINUED | OUTPATIENT
Start: 2017-07-21 | End: 2017-07-21 | Stop reason: HOSPADM

## 2017-07-21 RX ORDER — ATROPINE SULFATE 0.1 MG/ML
0.5 INJECTION INTRAVENOUS
Status: ACTIVE | OUTPATIENT
Start: 2017-07-21 | End: 2017-07-21

## 2017-07-21 RX ORDER — EPINEPHRINE 0.1 MG/ML
1 INJECTION INTRACARDIAC; INTRAVENOUS
Status: ACTIVE | OUTPATIENT
Start: 2017-07-21 | End: 2017-07-21

## 2017-07-21 RX ORDER — OMEGA-3S/DHA/EPA/FISH OIL/D3 300MG-1000
1 CAPSULE ORAL EVERY OTHER DAY
COMMUNITY
End: 2018-03-21

## 2017-07-21 RX ORDER — FLUMAZENIL 0.1 MG/ML
0.2 INJECTION INTRAVENOUS
Status: ACTIVE | OUTPATIENT
Start: 2017-07-21 | End: 2017-07-21

## 2017-07-21 RX ADMIN — PROPOFOL 50 MG: 10 INJECTION, EMULSION INTRAVENOUS at 12:35

## 2017-07-21 RX ADMIN — PROPOFOL 50 MG: 10 INJECTION, EMULSION INTRAVENOUS at 12:43

## 2017-07-21 RX ADMIN — LIDOCAINE HYDROCHLORIDE 40 MG: 20 INJECTION, SOLUTION EPIDURAL; INFILTRATION; INTRACAUDAL; PERINEURAL at 12:34

## 2017-07-21 RX ADMIN — PROPOFOL 50 MG: 10 INJECTION, EMULSION INTRAVENOUS at 12:52

## 2017-07-21 RX ADMIN — PROPOFOL 50 MG: 10 INJECTION, EMULSION INTRAVENOUS at 12:47

## 2017-07-21 RX ADMIN — SODIUM CHLORIDE: 9 INJECTION, SOLUTION INTRAVENOUS at 12:31

## 2017-07-21 RX ADMIN — PROPOFOL 50 MG: 10 INJECTION, EMULSION INTRAVENOUS at 12:34

## 2017-07-21 RX ADMIN — PROPOFOL 50 MG: 10 INJECTION, EMULSION INTRAVENOUS at 13:07

## 2017-07-21 RX ADMIN — PROPOFOL 50 MG: 10 INJECTION, EMULSION INTRAVENOUS at 12:38

## 2017-07-21 RX ADMIN — PROPOFOL 50 MG: 10 INJECTION, EMULSION INTRAVENOUS at 12:58

## 2017-07-21 RX ADMIN — SIMETHICONE 80 MG: 20 SUSPENSION/ DROPS ORAL at 13:12

## 2017-07-21 NOTE — H&P
Pre-endoscopy H and P for Colonoscopy    The patient was seen and examined. Date of last colonoscopy: 2011, Polyps  No      The airway was assessed and documented. The problem list, past medical history, and medications were reviewed. Patient Active Problem List   Diagnosis Code    Benign essential hypertension I10    Gout M10.9    Hypertriglyceridemia E78.1    Obesity (BMI 30-39. 9) E66.9    H/O mammogram Z92.89    H/O colonoscopy Z98.890    Right bundle branch block I45.10    Low HDL (under 40) E78.6    Elevated glucose R73.09    Open angle with borderline findings, low risk H40.019    ACP (advance care planning) Z71.89     Social History     Social History    Marital status:      Spouse name: N/A    Number of children: N/A    Years of education: N/A     Occupational History    Not on file. Social History Main Topics    Smoking status: Never Smoker    Smokeless tobacco: Never Used    Alcohol use No    Drug use: No    Sexual activity: Yes     Partners: Male     Other Topics Concern    Caffeine Concern No     avoids caffeine     Social History Narrative    ** Merged History Encounter **          Past Medical History:   Diagnosis Date    Contact dermatitis and other eczema, due to unspecified cause     rosacea; AK's    Gout     HTN (hypertension)     Hypercholesterolemia     Pre-diabetes      The patient has a family history of na    Prior to Admission Medications   Prescriptions Last Dose Informant Patient Reported? Taking? CHOLECALCIFEROL, VITAMIN D3, (VITAMIN D3 PO) 7/19/2017  Yes No   Sig: Take  by mouth. IBUPROFEN (ADVIL PO) 7/19/2017  Yes No   Sig: Take  by mouth. Takes occasionally    allopurinol (ZYLOPRIM) 300 mg tablet 7/19/2017  No No   Sig: Take 1 Tab by mouth daily. amLODIPine (NORVASC) 10 mg tablet 7/19/2017  No No   Sig: Take 1 Tab by mouth daily. atenolol (TENORMIN) 50 mg tablet 7/21/2017 at Unknown time  No Yes   Sig: Take 1 Tab by mouth daily.    ferrous sulfate (IRON) 325 mg (65 mg iron) tablet 7/19/2017 at Unknown time  Yes Yes   Sig: Take 325 mg by mouth daily. omega-3s-dha-epa-fish oil 1,000-1,400 mg cpDR 7/19/2017 at Unknown time  Yes Yes   Sig: Take 1 Cap by mouth every other day. valsartan (DIOVAN) 320 mg tablet 7/21/2017 at Unknown time  No Yes   Sig: Take 1 Tab by mouth daily. Facility-Administered Medications: None         The review of systems is:  negative for shortness of breath or chest pain      The heart, lungs and mental status were satisfactory for the administration of MAC sedation and for the procedure. Mallampati score: See Anesthesia. I discussed with the patient the objectives, risks, consequences and alternatives to the procedure. Plan: Endoscopic procedure with MAC sedation.     Kye Jaquez MD  7/21/2017  12:20 PM

## 2017-07-21 NOTE — ROUTINE PROCESS
Deep Brunner  1945  [id number]    Situation:  Verbal report received from: Claus Winkler RN  Procedure: Procedure(s):  COLONOSCOPY, EGD  ESOPHAGOGASTRODUODENOSCOPY (EGD)  ESOPHAGOGASTRODUODENAL (EGD) BIOPSY  ENDOSCOPIC POLYPECTOMY  INJECTION    Background:    Preoperative diagnosis: FECAL OCCULT BLOOD POSITIVE, MICROCYTIC ANEMIA  Postoperative diagnosis: Hiatal Hernia  Mild Erosive Gastritis  Mild Diverticulosis  Proximal Ascending Colon Mass    :  Dr. Jez Yanez  Assistant(s): Endoscopy Technician-1: Jordana Sheikh  Endoscopy RN-1: Shwetha Delvalle RN    Specimens:   ID Type Source Tests Collected by Time Destination   1 : Proximal Ascending Colon Polyps Preservative   Hank Payne MD 7/21/2017 1252 Pathology   2 : Distal Ascending/Hepatic Flexure Mass bx Preservative   Hank Payne MD 7/21/2017 1258 Pathology     H. Pylori  yes    Assessment:  Intra-procedure medications       Anesthesia gave intra-procedure sedation and medications, see anesthesia flow sheet yes    Intravenous fluids: NS@ KVO     Vital signs stable     Abdominal assessment: round and soft and obese    Recommendation:  Discharge patient per MD order  Family or Friend   Permission to share finding with family or friend yes

## 2017-07-21 NOTE — PROCEDURES
EGD Procedure Note    Indications:  Iron deficiency anemia   Referring Physician: Alex Mae MD   Anesthesia/Sedation:MAC  Endoscopist:  Dr. Juanito Thomas  Assistant:  Endoscopy Technician-1: Jp Munoz  Endoscopy RN-1: Abdulkadir Zambrano RN    Preoperative diagnosis: FECAL OCCULT BLOOD POSITIVE, MICROCYTIC ANEMIA    Postoperative diagnosis: Hiatal Hernia  Mild Erosive Gastritis  Mild Diverticulosis  Distal Ascending Colon Mass      Procedure in Detail:  Informed consent was obtained for the procedure, including sedation. Risks of perforation, hemorrhage, adverse drug reaction, and aspiration were discussed. The patient was placed in the left lateral decubitus position. Based on the pre-procedure assessment, including review of the patient's medical history, medications, allergies, and review of systems, she had been deemed to be an appropriate candidate for moderate sedation; she was therefore sedated with the medications listed above. The patient was monitored continuously with ECG tracing, pulse oximetry, blood pressure monitoring, and direct observations. An Olympus video endoscope was inserted into the mouth and advanced under direct vision to into the esophagus, then stomach and duodenum. A careful inspection was made as the gastroscope was withdrawn, including a retroflexed view of the proximal stomach; findings and interventions are described below. Findings:   Esophagus:normal  Stomach: mild patchy erosive gastritis  Duodenum/jejunum: normal    Therapies:  See above    Specimens: see above           EBL: None    Complications:   None; patient tolerated the procedure well. Recommendations:  -Acid suppression with a proton pump inhibitor. , -No NSAIDS    Leslie Camacho MD                 Colonoscopy Procedure Note    Indications:   Iron deficiency anemia, Occult blood in stool  Referring Physician: Alex Mae MD Anesthesia/Sedation:MAC  Endoscopist:  Dr. Jayy Chatman  Assistant:  Endoscopy Technician-1: Licha Greco  Endoscopy RN-1: Shayna Echeverria RN    Preoperative diagnosis: FECAL OCCULT BLOOD POSITIVE, MICROCYTIC ANEMIA    Postoperative diagnosis: Hiatal Hernia  Mild Erosive Gastritis  Mild Diverticulosis  Proximal Ascending Colon Mass      Procedure in Detail:  Informed consent was obtained for the procedure, including sedation. Risks of perforation, hemorrhage, adverse drug reaction, and aspiration were discussed. The patient was placed in the left lateral decubitus position. Based on the pre-procedure assessment, including review of the patient's medical history, medications, allergies, and review of systems, she had been deemed to be an appropriate candidate for moderate sedation; she was therefore sedated with the medications listed above. The patient was monitored continuously with ECG tracing, pulse oximetry, blood pressure monitoring, and direct observations. A rectal examination was performed. The [id number] was inserted into the rectum and advanced under direct vision to the cecum, which was identified by the ileocecal valve and appendiceal orifice. The quality of the colonic preparation was excellent. A careful inspection was made as the colonoscope was withdrawn, including a retroflexed view of the rectum; findings and interventions are described below. Findings:   Rectum: normal  Sigmoid: mild diverticulosis; Descending Colon: mild diverticulosis;  Transverse Colon: normal  Ascending Colon: 8 cm long ulcerative mass of the proximal ascending hepatic flexura. Tattoed x 3 proximally and x 2 distally. Bx; 2 - 2 mm polyps removed with cold forceps  Cecum: normal  Terminal Ileum: not intubated    Specimens:     see above    EBL: None    Complications: None; patient tolerated the procedure well. Recommendations:     - Await pathology.      - Follow up with surgery, CT scan CBC,CMP,CEA    Signed By: Wilhelm Mcardle, MD                        July 21, 2017

## 2017-07-21 NOTE — ANESTHESIA POSTPROCEDURE EVALUATION
Post-Anesthesia Evaluation and Assessment    Patient: Arabella Segal MRN: [id number]  SSN: [id number]    YOB: 1945  Age: 66 y.o. Sex: female       Cardiovascular Function/Vital Signs  Visit Vitals    /53    Pulse (!) 54    Temp 36.4 °C (97.6 °F)    Resp 20    Ht 5' 2\" (1.575 m)    Wt 91.6 kg (202 lb)    SpO2 92%    Breastfeeding No    BMI 36.95 kg/m2       Patient is status post MAC anesthesia for Procedure(s):  COLONOSCOPY, EGD  ESOPHAGOGASTRODUODENOSCOPY (EGD)  ESOPHAGOGASTRODUODENAL (EGD) BIOPSY  ENDOSCOPIC POLYPECTOMY  INJECTION. Nausea/Vomiting: None    Postoperative hydration reviewed and adequate. Pain:  Pain Scale 1: Visual (07/21/17 1318)  Pain Intensity 1: 0 (07/21/17 1318)   Managed    Neurological Status: At baseline    Mental Status and Level of Consciousness: Arousable    Pulmonary Status:   O2 Device: Room air (07/21/17 1318)   Adequate oxygenation and airway patent    Complications related to anesthesia: None    Post-anesthesia assessment completed.  No concerns    Signed By: Zulma Mckeon MD     July 21, 2017

## 2017-07-21 NOTE — ANESTHESIA PREPROCEDURE EVALUATION
Anesthetic History   No history of anesthetic complications            Review of Systems / Medical History  Patient summary reviewed, nursing notes reviewed and pertinent labs reviewed    Pulmonary  Within defined limits                 Neuro/Psych   Within defined limits           Cardiovascular    Hypertension              Exercise tolerance: >4 METS  Comments: Right bundle branch block   GI/Hepatic/Renal               Comments: Blood in stool  anemia Endo/Other        Obesity     Other Findings            Physical Exam    Airway  Mallampati: II  TM Distance: > 6 cm  Neck ROM: normal range of motion   Mouth opening: Normal     Cardiovascular    Rhythm: regular  Rate: normal         Dental  No notable dental hx       Pulmonary  Breath sounds clear to auscultation               Abdominal  Abdominal exam normal       Other Findings            Anesthetic Plan    ASA: 3  Anesthesia type: MAC          Induction: Intravenous  Anesthetic plan and risks discussed with: Patient

## 2017-07-21 NOTE — IP AVS SNAPSHOT
[address] 
[phone] Patient: Tato Machado MRN: [id number] JSI:[id number] You are allergic to the following Allergen Reactions Ace Inhibitors Cough Penicillins Hives Seafood (Shellfish Containing Products) Other (comments) All seafood aggravates gout Shellfish Containing Products Other (comments)  
 gout Terazosin Unknown (comments) Incont Recent Documentation Height Weight Breastfeeding? BMI OB Status Smoking Status 1.575 m 91.6 kg No 36.95 kg/m2 Postmenopausal Never Smoker Emergency Contacts Name Discharge Info Relation Home Work Mobile Kishore Farley  Spouse [3] [phone] About your hospitalization You were admitted on:  July 21, 2017 [facility] on:  July 21, 2017 Unit phone number:  [phone] Why you were hospitalized Your primary diagnosis was:  Not on File Providers Seen During Your Hospitalizations Provider Role Specialty Primary office phone India Shelby MD Attending Provider Gastroenterology [phone] Your Primary Care Physician (PCP) Primary Care Physician Office Phone Office Fax Amada Finley [phone] Follow-up Information None Current Discharge Medication List  
  
CONTINUE these medications which have NOT CHANGED Dose & Instructions Dispensing Information Comments Morning Noon Evening Bedtime ADVIL PO Your last dose was: Your next dose is: Take  by mouth. Takes occasionally Refills:  0  
     
   
   
   
  
 allopurinol 300 mg tablet Commonly known as:  Ari Shea Your last dose was: Your next dose is:    
   
   
 Dose:  300 mg Take 1 Tab by mouth daily. Quantity:  90 Tab Refills:  2 amLODIPine 10 mg tablet Commonly known as:  Unknown [place] Your last dose was: Your next dose is:    
   
   
 Dose:  10 mg Take 1 Tab by mouth daily. Quantity:  90 Tab Refills:  2  
     
   
   
   
  
 atenolol 50 mg tablet Commonly known as:  TENORMIN Your last dose was: Your next dose is:    
   
   
 Dose:  50 mg Take 1 Tab by mouth daily. Quantity:  90 Tab Refills:  2 Iron 325 mg (65 mg iron) tablet Generic drug:  ferrous sulfate Your last dose was: Your next dose is:    
   
   
 Dose:  325 mg Take 325 mg by mouth daily. Refills:  0  
     
   
   
   
  
 omega-3s-dha-epa-fish oil 1,000-1,400 mg Cpdr  
   
Your last dose was: Your next dose is:    
   
   
 Dose:  1 Cap Take 1 Cap by mouth every other day. Refills:  0  
     
   
   
   
  
 valsartan 320 mg tablet Commonly known as:  DIOVAN Your last dose was: Your next dose is:    
   
   
 Dose:  320 mg Take 1 Tab by mouth daily. Quantity:  90 Tab Refills:  2 VITAMIN D3 PO Your last dose was: Your next dose is: Take  by mouth. Refills:  0 Discharge Instructions Juan Harrell [id number] 
1945 COLON DISCHARGE INSTRUCTIONS Discomfort: 
Redness at IV site- apply warm compress to area; if redness or soreness persist- contact your physician There may be a slight amount of blood passed from the rectum Gaseous discomfort- walking, belching will help relieve any discomfort You may not operate a vehicle for 12 hours You may not engage in an occupation involving machinery or appliances for rest of today You may not drink alcoholic beverages for at least 12 hours Avoid making any critical decisions for at least 24 hour DIET: 
 Regular diet.  however -  remember your colon is empty and a heavy meal will produce gas. Avoid these foods:  vegetables, fried / greasy foods, carbonated drinks for today. ACTIVITY: 
You may resume your normal daily activities it is recommended that you spend the remainder of the day resting -  avoid any strenuous activity. CALL M.D. ANY SIGN OF: Increasing pain, nausea, vomiting Abdominal distension (swelling) New increased bleeding (oral or rectal) Fever (chills) Pain in chest area Bloody discharge from nose or mouth Shortness of breath You may not  take any Advil, Aspirin, Ibuprofen, Motrin, Aleve, Goodys, or any similar pain or arthritis products, ONLY  Tylenol as needed for pain. Start Prilosec Follow-up Instructions: 
 Call Dr. Eugene Reyes Results of procedure / biopsy in 10-14days Office telephone for problems or questions [phone] Recommendation for next colonscopy in 1 year If < 10 years, reason:  above average risk patient Discharge Orders None ACO Transitions of Care Introducing [facility] [address] offers a voluntary care coordination program to provide high quality service and care to [facility] fee-for-service beneficiaries. Sravanthi Loyd was designed to help you enhance your health and well-being through the following services: ? Transitions of Care  support for individuals who are transitioning from one care setting to another (example: Hospital to home). ? Chronic and Complex Care Coordination  support for individuals and caregivers of those with serious or chronic illnesses or with more than one chronic (ongoing) condition and those who take a number of different medications. If you meet specific medical criteria, a [facility] [address] may call you directly to coordinate your care with your primary care physician and your other care providers. For questions about the [facility] programs, please, contact your physicians office. For general questions or additional information about Accountable Care Organizations: 
Please visit www.medicare.gov/acos. html or call 1-800-MEDICARE (6-465.639.1365) TTY users should call 7-519.987.8532. Introducing [facility]! Dear Saji Yanez: 
Thank you for requesting a Hers account. Our records indicate that you already have an active Hers account. You can access your account anytime at [email or web address] Did you know that you can access your hospital and ER discharge instructions at any time in Hers? You can also review all of your test results from your hospital stay or ER visit. Additional Information If you have questions, please visit the Frequently Asked Questions section of the Hers website at [email or web address] Remember, Hers is NOT to be used for urgent needs. For medical emergencies, dial 911. Now available from your iPhone and Android! General Information Please provide this summary of care documentation to your next provider. Patient Signature:  ____________________________________________________________ Date:  ____________________________________________________________  
  
[facility] Signature:  ____________________________________________________________ Date:  ____________________________________________________________

## 2017-07-21 NOTE — DISCHARGE INSTRUCTIONS
Ned Tracey  [id number]  1945    COLON DISCHARGE INSTRUCTIONS  Discomfort:  Redness at IV site- apply warm compress to area; if redness or soreness persist- contact your physician  There may be a slight amount of blood passed from the rectum  Gaseous discomfort- walking, belching will help relieve any discomfort  You may not operate a vehicle for 12 hours  You may not engage in an occupation involving machinery or appliances for rest of today  You may not drink alcoholic beverages for at least 12 hours  Avoid making any critical decisions for at least 24 hour  DIET:   Regular diet. - however -  remember your colon is empty and a heavy meal will produce gas. Avoid these foods:  vegetables, fried / greasy foods, carbonated drinks for today. ACTIVITY:  You may resume your normal daily activities it is recommended that you spend the remainder of the day resting -  avoid any strenuous activity. CALL M.D. ANY SIGN OF:  Increasing pain, nausea, vomiting  Abdominal distension (swelling)  New increased bleeding (oral or rectal)  Fever (chills)  Pain in chest area  Bloody discharge from nose or mouth  Shortness of breath    You may not  take any Advil, Aspirin, Ibuprofen, Motrin, Aleve, Goodys, or any similar pain or arthritis products, ONLY  Tylenol as needed for pain.   Start Prilosec      Follow-up Instructions:   Call Dr. Maite Ken  Results of procedure / biopsy in 10-14days   Office telephone for problems or questions [phone]    Recommendation for next colonscopy in 1 year  If < 10 years, reason:  above average risk patient

## 2017-07-26 ENCOUNTER — HOSPITAL ENCOUNTER (OUTPATIENT)
Dept: CT IMAGING | Age: 72
Discharge: HOME | End: 2017-07-26
Attending: SPECIALIST
Payer: MEDICARE

## 2017-07-26 DIAGNOSIS — C18.9 COLON CANCER (HCC): ICD-10-CM

## 2017-07-26 PROCEDURE — 74011000258 HC RX REV CODE- 258: Performed by: RADIOLOGY

## 2017-07-26 PROCEDURE — 71260 CT THORAX DX C+: CPT

## 2017-07-26 PROCEDURE — 74011636320 HC RX REV CODE- 636/320: Performed by: RADIOLOGY

## 2017-07-26 PROCEDURE — 74177 CT ABD & PELVIS W/CONTRAST: CPT

## 2017-07-26 PROCEDURE — 74011000255 HC RX REV CODE- 255: Performed by: RADIOLOGY

## 2017-07-26 RX ORDER — BARIUM SULFATE 20 MG/ML
450 SUSPENSION ORAL
Status: COMPLETED | OUTPATIENT
Start: 2017-07-26 | End: 2017-07-26

## 2017-07-26 RX ORDER — SODIUM CHLORIDE 9 MG/ML
50 INJECTION, SOLUTION INTRAVENOUS
Status: COMPLETED | OUTPATIENT
Start: 2017-07-26 | End: 2017-07-26

## 2017-07-26 RX ADMIN — SODIUM CHLORIDE 50 ML/HR: 900 INJECTION, SOLUTION INTRAVENOUS at 11:18

## 2017-07-26 RX ADMIN — BARIUM SULFATE 450 ML: 21 SUSPENSION ORAL at 11:18

## 2017-07-26 RX ADMIN — IOPAMIDOL 100 ML: 755 INJECTION, SOLUTION INTRAVENOUS at 11:18

## 2017-08-22 PROBLEM — C18.9: Status: ACTIVE | Noted: 2017-08-22

## 2018-01-12 ENCOUNTER — TELEPHONE (OUTPATIENT)
Dept: INTERNAL MEDICINE CLINIC | Age: 73
End: 2018-01-12

## 2018-01-12 NOTE — TELEPHONE ENCOUNTER
Patient advised that urine needs to be tested in order to have proper treatment.  She expressed understanding

## 2018-01-12 NOTE — TELEPHONE ENCOUNTER
----- Message from Cherri Vega sent at 1/12/2018  8:48 AM EST -----  Regarding: Paty/gauri  Pt is requesting a Rx for a UTI that started four days ago and she is out of town today. Pts number is [phone]  and [facility] number is [phone].

## 2018-03-21 ENCOUNTER — OFFICE VISIT (OUTPATIENT)
Dept: INTERNAL MEDICINE CLINIC | Age: 73
End: 2018-03-21

## 2018-03-21 VITALS
WEIGHT: 206.6 LBS | DIASTOLIC BLOOD PRESSURE: 80 MMHG | OXYGEN SATURATION: 94 % | SYSTOLIC BLOOD PRESSURE: 140 MMHG | TEMPERATURE: 98.6 F | BODY MASS INDEX: 38.02 KG/M2 | RESPIRATION RATE: 16 BRPM | HEIGHT: 62 IN | HEART RATE: 80 BPM

## 2018-03-21 DIAGNOSIS — Z23 ENCOUNTER FOR IMMUNIZATION: ICD-10-CM

## 2018-03-21 DIAGNOSIS — R73.09 ELEVATED GLUCOSE: ICD-10-CM

## 2018-03-21 DIAGNOSIS — Z00.00 MEDICARE ANNUAL WELLNESS VISIT, SUBSEQUENT: Primary | ICD-10-CM

## 2018-03-21 DIAGNOSIS — K21.9 GASTROESOPHAGEAL REFLUX DISEASE WITHOUT ESOPHAGITIS: ICD-10-CM

## 2018-03-21 DIAGNOSIS — M1A.0790 CHRONIC GOUT OF FOOT, UNSPECIFIED CAUSE, UNSPECIFIED LATERALITY: ICD-10-CM

## 2018-03-21 DIAGNOSIS — E66.01 SEVERE OBESITY (BMI 35.0-39.9) WITH COMORBIDITY (HCC): ICD-10-CM

## 2018-03-21 DIAGNOSIS — C18.9 ADENOCARCINOMA OF COLON, DUKE'S C (HCC): ICD-10-CM

## 2018-03-21 DIAGNOSIS — Z00.00 WELL ADULT EXAM: ICD-10-CM

## 2018-03-21 RX ORDER — OMEPRAZOLE 20 MG/1
CAPSULE, DELAYED RELEASE ORAL
COMMUNITY
Start: 2018-01-03 | End: 2018-03-21 | Stop reason: SDUPTHER

## 2018-03-21 RX ORDER — ALLOPURINOL 300 MG/1
300 TABLET ORAL DAILY
Qty: 90 TAB | Refills: 2 | Status: SHIPPED | OUTPATIENT
Start: 2018-03-21 | End: 2018-12-23 | Stop reason: SDUPTHER

## 2018-03-21 RX ORDER — OMEPRAZOLE 20 MG/1
20 CAPSULE, DELAYED RELEASE ORAL DAILY
Qty: 90 CAP | Refills: 2 | Status: SHIPPED | OUTPATIENT
Start: 2018-03-21 | End: 2018-12-23 | Stop reason: SDUPTHER

## 2018-03-21 NOTE — MR AVS SNAPSHOT
[address] [facility], [address] [place] [address] 
[phone] Patient: Eneida Wynn MRN: [id number] ZSK:[id number] Visit Information Date & Time Provider Department Dept. Phone Encounter #  
 3/21/2018  1:45 PM Constantin Mercer MD [place] [id number] [facility] [phone] [id number] Follow-up Instructions Return in about 4 months (around 7/21/2018) for F/u BP. Upcoming Health Maintenance Date Due Pneumococcal 65+ High/Highest Risk (2 of 2 - PCV13) 1/7/2014 GLAUCOMA SCREENING Q2Y 3/22/2019 MEDICARE YEARLY EXAM 3/22/2019 BREAST CANCER SCRN MAMMOGRAM 8/29/2019 DTaP/Tdap/Td series (2 - Td) 1/9/2023 COLONOSCOPY 7/21/2027 Allergies as of 3/21/2018  Review Complete On: 3/21/2018 By: Constantin Mercer MD  
  
 Severity Noted Reaction Type Reactions Ace Inhibitors  09/28/2011    Cough Penicillins  10/14/2011    Hives Seafood [Shellfish Containing Products]  10/14/2011    Other (comments) All seafood aggravates gout Shellfish Containing Products  12/05/2011   Side Effect Other (comments)  
 gout Terazosin  09/28/2011    Unknown (comments) Incont Current Immunizations  Reviewed on 3/21/2018 Name Date Influenza High Dose Vaccine PF 10/1/2017 Influenza Vaccine 10/1/2013 Influenza Vaccine Split 10/7/2012 Pneumococcal Polysaccharide (PPSV-23) 1/7/2013 TD Vaccine 8/1/2002 Tdap 1/9/2013 Zoster Vaccine, Live 11/21/2012 Reviewed by Ramón Pizarro LPN on 3/81/3427 at  1:52 PM  
You Were Diagnosed With   
  
 Codes Comments Medicare annual wellness visit, subsequent    -  Primary ICD-10-CM: Z00.00 ICD-9-CM: V70.0 Chronic gout of foot, unspecified cause, unspecified laterality     ICD-10-CM: M1A.0790 ICD-9-CM: 274.02 Gastroesophageal reflux disease without esophagitis     ICD-10-CM: K21.9 ICD-9-CM: 530.81   
 Adenocarcinoma of colon, Duke's C ([facility]     ICD-10-CM: C18.9 ICD-9-CM: 153.9 Severe obesity (BMI 35.0-39. 9) with comorbidity ([facility].)     ICD-10-CM: E66.01 
ICD-9-CM: 278.01 Elevated glucose     ICD-10-CM: R73.09 
ICD-9-CM: 790.29 Encounter for immunization     ICD-10-CM: F32 ICD-9-CM: V03.89 Well adult exam     ICD-10-CM: Z00.00 ICD-9-CM: V70.0 Vitals BP Pulse Temp Resp Height(growth percentile) Weight(growth percentile) 140/80 (BP 1 Location: Left arm, BP Patient Position: Sitting) 80 98.6 °F (37 °C) (Oral) 16 5' 2\" (1.575 m) 206 lb 9.6 oz (93.7 kg) SpO2 BMI OB Status Smoking Status 94% 37.79 kg/m2 Postmenopausal Never Smoker BMI and BSA Data Body Mass Index Body Surface Area  
 37.79 kg/m 2 2.02 m 2 Preferred Pharmacy Pharmacy Name Phone [id number] [place] [address] Your Updated Medication List  
  
   
This list is accurate as of 3/21/18  2:22 PM.  Always use your most recent med list.  
  
  
  
  
 allopurinol 300 mg tablet Commonly known as:  Davied Ady Take 1 Tab by mouth daily. Iron 325 mg (65 mg iron) tablet Generic drug:  ferrous sulfate Take 325 mg by mouth daily. omeprazole 20 mg capsule Commonly known as:  PRILOSEC Take 1 Cap by mouth daily. pneumococcal 13 jania conj dip 0.5 mL Syrg injection Commonly known as:  PREVNAR-13  
0.5 mL by IntraMUSCular route once for 1 dose. Prescriptions Printed Refills  
 omeprazole (PRILOSEC) 20 mg capsule 2 Sig: Take 1 Cap by mouth daily. Class: Print Route: Oral  
 allopurinol (ZYLOPRIM) 300 mg tablet 2 Sig: Take 1 Tab by mouth daily. Class: Print Route: Oral  
 pneumococcal 13 jania conj dip (PREVNAR-13) 0.5 mL syrg injection 0 Si.5 mL by IntraMUSCular route once for 1 dose. Class: Print Route: IntraMUSCular Follow-up Instructions Return in about 4 months (around 7/21/2018) for F/u BP. To-Do List   
 03/22/2018 Lab:  CBC WITH AUTOMATED DIFF   
  
 03/22/2018 Lab:  HEMOGLOBIN A1C WITH EAG   
  
 03/22/2018 Lab:  LIPID PANEL   
  
 03/22/2018 Lab:  METABOLIC PANEL, COMPREHENSIVE   
  
 03/22/2018 Lab:  TSH REFLEX TO T4   
  
 03/22/2018 Lab:  URINALYSIS W/ RFLX MICROSCOPIC Patient Instructions Follow a low salt, Mediterranean diet. Get regular aerobic exercise. Lose 15 pounds over the next 6 months. Have fasting blood work analysis done. Get the Prevnar vaccine at your pharmacy. Monitor your blood pressure. Medicare Wellness Visit, Female The best way to live healthy is to have a healthy lifestyle by eating a well-balanced diet, exercising regularly, limiting alcohol and stopping smoking. Regular physical exams and screening tests are another way to keep healthy. Preventive exams provided by your health care provider can find health problems before they become diseases or illnesses. Preventive services including immunizations, screening tests, monitoring and exams can help you take care of your own health. All people over age 72 should have a pneumovax  and and a prevnar shot to prevent pneumonia. These are once in a lifetime unless you and your provider decide differently. All people over 65 should have a yearly flu shot and a tetanus vaccine every 10 years. A bone mass density to screen for osteoporosis or thinning of the bones should be done every 2 years after 65. Screening for diabetes mellitus with a blood sugar test should be done every year. Glaucoma is a disease of the eye due to increased ocular pressure that can lead to blindness and it should be done every year by an eye professional. 
 
Cardiovascular screening tests that check for elevated lipids (fatty part of blood) which can lead to heart disease and strokes should be done every 5 years. Colorectal screening that evaluates for blood or polyps in your colon should be done yearly as a stool test or every five years as a flexible sigmoidoscope or every 10 years as a colonoscopy up to age 76. Breast cancer screening with a mammogram is recommended biennially  for women age 54-69. Screening for cervical cancer with a pap smear and pelvic exam is recommended for women after age 72 years every 2 years up to age 79 or when the provider and patient decide to stop. If there is a history of cervical abnormalities or other increased risk for cancer then the test is recommended yearly. Hepatitis C screening is also recommended for anyone born between [birth year] through [address]. A shingles vaccine is also recommended once in a lifetime after age 61. Your Medicare Wellness Exam is recommended annually. Here is a list of your current Health Maintenance items with a due date: 
Health Maintenance Due Topic Date Due  Pneumococcal Vaccine (2 of 2 - PCV13) 01/07/2014 Learning About the [address] [facility] [employer] Diet What is the Mediterranean diet? The Mediterranean diet is a style of eating rather than a diet plan. It features foods eaten in [place], [place], [place] and [place], and other countries along the [facility]. It emphasizes eating foods like fish, fruits, vegetables, beans, high-fiber breads and whole grains, nuts, and olive oil. This style of eating includes limited red meat, cheese, and sweets. Why choose the Mediterranean diet? A Mediterranean-style diet may improve heart health. It contains more fat than other heart-healthy diets. But the fats are mainly from nuts, unsaturated oils (such as fish oils and olive oil), and certain nut or seed oils (such as canola, soybean, or flaxseed oil). These fats may help protect the heart and blood vessels. How can you get started on the Mediterranean diet? Here are some things you can do to switch to a more Mediterranean way of eating. What to eat · Eat a variety of fruits and vegetables each day, such as grapes, blueberries, tomatoes, broccoli, peppers, figs, olives, spinach, eggplant, beans, lentils, and chickpeas. · Eat a variety of whole-grain foods each day, such as oats, brown rice, and whole wheat bread, pasta, and couscous. · Eat fish at least 2 times a week. Try tuna, salmon, mackerel, lake trout, herring, or sardines. · Eat moderate amounts of low-fat dairy products, such as milk, cheese, or yogurt. · Eat moderate amounts of poultry and eggs. · Choose healthy (unsaturated) fats, such as nuts, olive oil, and certain nut or seed oils like canola, soybean, and flaxseed. · Limit unhealthy (saturated) fats, such as butter, palm oil, and coconut oil. And limit fats found in animal products, such as meat and dairy products made with whole milk. Try to eat red meat only a few times a month in very small amounts. · Limit sweets and desserts to only a few times a week. This includes sugar-sweetened drinks like soda. The Mediterranean diet may also include red wine with your meal-1 glass each day for women and up to 2 glasses a day for men. Tips for eating at home · Use herbs, spices, garlic, lemon zest, and citrus juice instead of salt to add flavor to foods. · Add avocado slices to your sandwich instead of zambrano. · Have fish for lunch or dinner instead of red meat. Brush the fish with olive oil, and broil or grill it. · Sprinkle your salad with seeds or nuts instead of cheese. · Cook with olive or canola oil instead of butter or oils that are high in saturated fat. · Switch from 2% milk or whole milk to 1% or fat-free milk. · Dip raw vegetables in a vinaigrette dressing or hummus instead of dips made from mayonnaise or sour cream. 
· Have a piece of fruit for dessert instead of a piece of cake. Try baked apples, or have some dried fruit. Tips for eating out · Try broiled, grilled, baked, or poached fish instead of having it fried or breaded. · Ask your  to have your meals prepared with olive oil instead of butter. · Order dishes made with marinara sauce or sauces made from olive oil. Avoid sauces made from cream or mayonnaise. · Choose whole-grain breads, whole wheat pasta and pizza crust, brown rice, beans, and lentils. · Cut back on butter or margarine on bread. Instead, you can dip your bread in a small amount of olive oil. · Ask for a side salad or grilled vegetables instead of french fries or chips. Where can you learn more? Go to http://morenita-elijah.info/. Enter [phone] in the search box to learn more about \"Learning About the Mediterranean Diet. \" Current as of: May 12, 2017 Content Version: 11.4 © 7574-7894 [employer]. Care instructions adapted under license by [employer] (which disclaims liability or warranty for this information). If you have questions about a medical condition or this instruction, always ask your healthcare professional. [place] [address] any warranty or liability for your use of this information. Introducing [facility]! Dear Vinnie Quick: 
Thank you for requesting a [employer] account. Our records indicate that you already have an active [employer] account. You can access your account anytime at [email or web address] Did you know that you can access your hospital and ER discharge instructions at any time in [employer]? You can also review all of your test results from your hospital stay or ER visit. Additional Information If you have questions, please visit the Frequently Asked Questions section of the [employer] website at [email or web address] Remember, [employer] is NOT to be used for urgent needs. For medical emergencies, dial 911. Now available from your iPhone and Android! Please provide this summary of care documentation to your next provider. Your primary care clinician is listed as Aziza Hu. If you have any questions after today's visit, please call [phone].

## 2018-03-21 NOTE — PATIENT INSTRUCTIONS
Follow a low salt, Mediterranean diet. Get regular aerobic exercise. Lose 15 pounds over the next 6 months. Have fasting blood work analysis done. Get the Prevnar vaccine at your pharmacy. Monitor your blood pressure. Medicare Wellness Visit, Female    The best way to live healthy is to have a healthy lifestyle by eating a well-balanced diet, exercising regularly, limiting alcohol and stopping smoking. Regular physical exams and screening tests are another way to keep healthy. Preventive exams provided by your health care provider can find health problems before they become diseases or illnesses. Preventive services including immunizations, screening tests, monitoring and exams can help you take care of your own health. All people over age 72 should have a pneumovax  and and a prevnar shot to prevent pneumonia. These are once in a lifetime unless you and your provider decide differently. All people over 65 should have a yearly flu shot and a tetanus vaccine every 10 years. A bone mass density to screen for osteoporosis or thinning of the bones should be done every 2 years after 65. Screening for diabetes mellitus with a blood sugar test should be done every year. Glaucoma is a disease of the eye due to increased ocular pressure that can lead to blindness and it should be done every year by an eye professional.    Cardiovascular screening tests that check for elevated lipids (fatty part of blood) which can lead to heart disease and strokes should be done every 5 years. Colorectal screening that evaluates for blood or polyps in your colon should be done yearly as a stool test or every five years as a flexible sigmoidoscope or every 10 years as a colonoscopy up to age 76. Breast cancer screening with a mammogram is recommended biennially  for women age 54-69.     Screening for cervical cancer with a pap smear and pelvic exam is recommended for women after age 72 years every 2 years up to age 79 or when the provider and patient decide to stop. If there is a history of cervical abnormalities or other increased risk for cancer then the test is recommended yearly. Hepatitis C screening is also recommended for anyone born between [birth year] through [address]. A shingles vaccine is also recommended once in a lifetime after age 61. Your Medicare Wellness Exam is recommended annually. Here is a list of your current Health Maintenance items with a due date:  Health Maintenance Due   Topic Date Due    Pneumococcal Vaccine (2 of 2 - PCV13) 01/07/2014          Learning About the Mediterranean Diet  What is the Mediterranean diet? The Mediterranean diet is a style of eating rather than a diet plan. It features foods eaten in [place], [place], [place] and [place], and other countries along the [facility]. It emphasizes eating foods like fish, fruits, vegetables, beans, high-fiber breads and whole grains, nuts, and olive oil. This style of eating includes limited red meat, cheese, and sweets. Why choose the Mediterranean diet? A Mediterranean-style diet may improve heart health. It contains more fat than other heart-healthy diets. But the fats are mainly from nuts, unsaturated oils (such as fish oils and olive oil), and certain nut or seed oils (such as canola, soybean, or flaxseed oil). These fats may help protect the heart and blood vessels. How can you get started on the Mediterranean diet? Here are some things you can do to switch to a more Mediterranean way of eating. What to eat  · Eat a variety of fruits and vegetables each day, such as grapes, blueberries, tomatoes, broccoli, peppers, figs, olives, spinach, eggplant, beans, lentils, and chickpeas. · Eat a variety of whole-grain foods each day, such as oats, brown rice, and whole wheat bread, pasta, and couscous. · Eat fish at least 2 times a week. Try tuna, salmon, mackerel, lake trout, herring, or sardines.   · Eat moderate amounts of low-fat dairy products, such as milk, cheese, or yogurt. · Eat moderate amounts of poultry and eggs. · Choose healthy (unsaturated) fats, such as nuts, olive oil, and certain nut or seed oils like canola, soybean, and flaxseed. · Limit unhealthy (saturated) fats, such as butter, palm oil, and coconut oil. And limit fats found in animal products, such as meat and dairy products made with whole milk. Try to eat red meat only a few times a month in very small amounts. · Limit sweets and desserts to only a few times a week. This includes sugar-sweetened drinks like soda. The Mediterranean diet may also include red wine with your meal-1 glass each day for women and up to 2 glasses a day for men. Tips for eating at home  · Use herbs, spices, garlic, lemon zest, and citrus juice instead of salt to add flavor to foods. · Add avocado slices to your sandwich instead of zambrano. · Have fish for lunch or dinner instead of red meat. Brush the fish with olive oil, and broil or grill it. · Sprinkle your salad with seeds or nuts instead of cheese. · Cook with olive or canola oil instead of butter or oils that are high in saturated fat. · Switch from 2% milk or whole milk to 1% or fat-free milk. · Dip raw vegetables in a vinaigrette dressing or hummus instead of dips made from mayonnaise or sour cream.  · Have a piece of fruit for dessert instead of a piece of cake. Try baked apples, or have some dried fruit. Tips for eating out  · Try broiled, grilled, baked, or poached fish instead of having it fried or breaded. · Ask your  to have your meals prepared with olive oil instead of butter. · Order dishes made with marinara sauce or sauces made from olive oil. Avoid sauces made from cream or mayonnaise. · Choose whole-grain breads, whole wheat pasta and pizza crust, brown rice, beans, and lentils. · Cut back on butter or margarine on bread.  Instead, you can dip your bread in a small amount of olive oil.  · Ask for a side salad or grilled vegetables instead of french fries or chips. Where can you learn more? Go to http://morenita-elijah.info/. Enter 931-905-9290 in the search box to learn more about \"Learning About the Mediterranean Diet. \"  Current as of: May 12, 2017  Content Version: 11.4  © 3213-3170 [employer]. Care instructions adapted under license by [email or web address] (which disclaims liability or warranty for this information). If you have questions about a medical condition or this instruction, always ask your healthcare professional. [place] [address] any warranty or liability for your use of this information.

## 2018-03-21 NOTE — PROGRESS NOTES
Subjective:     Chief Complaint   Patient presents with    Annual Wellness Visit     She  is a 67y.o. year old female who presents for evaluation. Patient presents for Medicare Wellness exam.  No acute complaints noted. Historical Data    Past Medical History:   Diagnosis Date    Contact dermatitis and other eczema, due to unspecified cause     rosacea; AK's    Gout     HTN (hypertension)     Hypercholesterolemia     Pre-diabetes         Past Surgical History:   Procedure Laterality Date    COLONOSCOPY N/A 7/21/2017    COLONOSCOPY, EGD performed by Em Gill MD at [address] HX COLONOSCOPY  2012 2012, 2017    HX ENDOSCOPY  2017    HX HEENT  1949    T & A    HX ORTHOPAEDIC  2000    bilateral hammertoe surgery    HX ORTHOPAEDIC  2002    r rotator cuff repair        Outpatient Encounter Prescriptions as of 3/21/2018   Medication Sig Dispense Refill    omeprazole (PRILOSEC) 20 mg capsule       ferrous sulfate (IRON) 325 mg (65 mg iron) tablet Take 325 mg by mouth daily.  allopurinol (ZYLOPRIM) 300 mg tablet Take 1 Tab by mouth daily. 90 Tab 2    omega-3s-dha-epa-fish oil 1,000-1,400 mg cpDR Take 1 Cap by mouth every other day.  CHOLECALCIFEROL, VITAMIN D3, (VITAMIN D3 PO) Take  by mouth.  IBUPROFEN (ADVIL PO) Take  by mouth. Takes occasionally        No facility-administered encounter medications on file as of 3/21/2018. Allergies   Allergen Reactions    Ace Inhibitors Cough    Penicillins Hives    Seafood [Shellfish Containing Products] Other (comments)     All seafood aggravates gout    Shellfish Containing Products Other (comments)     gout    Terazosin Unknown (comments)     Incont        Social History     Social History    Marital status:      Spouse name: N/A    Number of children: N/A    Years of education: N/A     Occupational History    Not on file.      Social History Main Topics    Smoking status: Never Smoker    Smokeless tobacco: Never Used    Alcohol use No    Drug use: No    Sexual activity: Yes     Partners: Male     Other Topics Concern    Caffeine Concern No     avoids caffeine     Social History Narrative    ** Merged History Encounter **               Review of Systems  A comprehensive review of systems was negative except for that written in the HPI. Objective:     Vitals:    03/21/18 1352   BP: 140/80   Pulse: 80   Resp: 16   Temp: 98.6 °F (37 °C)   TempSrc: Oral   SpO2: 94%   Weight: 206 lb 9.6 oz (93.7 kg)   Height: 5' 2\" (1.575 m)     Pleasant WF in no acute distress. Skin: Warm with normal turgor. No macules, papules or stria. HEENT:    Head:  Normocephalic, atraumatic   Ears:  Canals are clear. TM's are intact. Eyes:  PERRLA. EOMI. Throat: Clear. No exudates. Neck: Supple. No masses. No adenopathy. Cardiac: Regular rate and rhythm. No murmurs, gallops or rubs. Lungs: Clear to ausculation. No wheezes, rhonchi or rales. Abdomen: Soft and non-tender. No HSM. Extremities: Pulses intact. No swelling. Musculoskeletal: No joint effusions. Neurologic:    CN's:  II-XII intact. Motor:  Symmetric and full. Reflexes:  Symmetric and full. ASSESSMENT / PLAN:   1. Medicare annual wellness visit, subsequent  · Review completed. 2. Chronic gout of foot, unspecified cause, unspecified laterality  · Continue current therapy. - allopurinol (ZYLOPRIM) 300 mg tablet; Take 1 Tab by mouth daily. Dispense: 90 Tab; Refill: 2    3. Gastroesophageal reflux disease without esophagitis  · Continue current therapy  - omeprazole (PRILOSEC) 20 mg capsule; Take 1 Cap by mouth daily. Dispense: 90 Cap; Refill: 2    4. Adenocarcinoma of colon, [facility] C ([facility] [address]  · Follow up with Oncology. 5. Severe obesity (BMI 35.0-39. 9) with comorbidity ([facility] [address]  I have reviewed/discussed the above normal BMI with the patient.   I have recommended the following interventions: dietary management education, guidance, and counseling . [facility] 6. Elevated glucose  · Control high glycemic index foods.  - HEMOGLOBIN A1C WITH EAG; Future    7. Encounter for immunization      8. Well adult exam    - CBC WITH AUTOMATED DIFF; Future  - LIPID PANEL; Future  - TSH REFLEX TO T4; Future  - URINALYSIS W/ RFLX MICROSCOPIC; Future  - METABOLIC PANEL, COMPREHENSIVE; Future    Patient Instructions     Follow a low salt, Mediterranean diet. Get regular aerobic exercise. Lose 15 pounds over the next 6 months. Have fasting blood work analysis done. Get the Prevnar vaccine at your pharmacy. Monitor your blood pressure. Medicare Wellness Visit, Female    The best way to live healthy is to have a healthy lifestyle by eating a well-balanced diet, exercising regularly, limiting alcohol and stopping smoking. Regular physical exams and screening tests are another way to keep healthy. Preventive exams provided by your health care provider can find health problems before they become diseases or illnesses. Preventive services including immunizations, screening tests, monitoring and exams can help you take care of your own health. All people over age 72 should have a pneumovax  and and a prevnar shot to prevent pneumonia. These are once in a lifetime unless you and your provider decide differently. All people over 65 should have a yearly flu shot and a tetanus vaccine every 10 years. A bone mass density to screen for osteoporosis or thinning of the bones should be done every 2 years after 65. Screening for diabetes mellitus with a blood sugar test should be done every year. Glaucoma is a disease of the eye due to increased ocular pressure that can lead to blindness and it should be done every year by an eye professional.    Cardiovascular screening tests that check for elevated lipids (fatty part of blood) which can lead to heart disease and strokes should be done every 5 years.     Colorectal screening that evaluates for blood or polyps in your colon should be done yearly as a stool test or every five years as a flexible sigmoidoscope or every 10 years as a colonoscopy up to age 76. Breast cancer screening with a mammogram is recommended biennially  for women age 54-69. Screening for cervical cancer with a pap smear and pelvic exam is recommended for women after age 72 years every 2 years up to age 79 or when the provider and patient decide to stop. If there is a history of cervical abnormalities or other increased risk for cancer then the test is recommended yearly. Hepatitis C screening is also recommended for anyone born between [birth year] through [address]. A shingles vaccine is also recommended once in a lifetime after age 61. Your Medicare Wellness Exam is recommended annually. Here is a list of your current Health Maintenance items with a due date:  Health Maintenance Due   Topic Date Due    Pneumococcal Vaccine (2 of 2 - PCV13) 01/07/2014          Learning About the Mediterranean Diet  What is the Mediterranean diet? The Mediterranean diet is a style of eating rather than a diet plan. It features foods eaten in [place], [place], [place] and [place], and other countries along the [facility]. It emphasizes eating foods like fish, fruits, vegetables, beans, high-fiber breads and whole grains, nuts, and olive oil. This style of eating includes limited red meat, cheese, and sweets. Why choose the Mediterranean diet? A Mediterranean-style diet may improve heart health. It contains more fat than other heart-healthy diets. But the fats are mainly from nuts, unsaturated oils (such as fish oils and olive oil), and certain nut or seed oils (such as canola, soybean, or flaxseed oil). These fats may help protect the heart and blood vessels. How can you get started on the Mediterranean diet? Here are some things you can do to switch to a more Mediterranean way of eating.   What to eat  · Eat a variety of fruits and vegetables each day, such as grapes, blueberries, tomatoes, broccoli, peppers, figs, olives, spinach, eggplant, beans, lentils, and chickpeas. · Eat a variety of whole-grain foods each day, such as oats, brown rice, and whole wheat bread, pasta, and couscous. · Eat fish at least 2 times a week. Try tuna, salmon, mackerel, lake trout, herring, or sardines. · Eat moderate amounts of low-fat dairy products, such as milk, cheese, or yogurt. · Eat moderate amounts of poultry and eggs. · Choose healthy (unsaturated) fats, such as nuts, olive oil, and certain nut or seed oils like canola, soybean, and flaxseed. · Limit unhealthy (saturated) fats, such as butter, palm oil, and coconut oil. And limit fats found in animal products, such as meat and dairy products made with whole milk. Try to eat red meat only a few times a month in very small amounts. · Limit sweets and desserts to only a few times a week. This includes sugar-sweetened drinks like soda. The Mediterranean diet may also include red wine with your meal-1 glass each day for women and up to 2 glasses a day for men. Tips for eating at home  · Use herbs, spices, garlic, lemon zest, and citrus juice instead of salt to add flavor to foods. · Add avocado slices to your sandwich instead of zambrano. · Have fish for lunch or dinner instead of red meat. Brush the fish with olive oil, and broil or grill it. · Sprinkle your salad with seeds or nuts instead of cheese. · Cook with olive or canola oil instead of butter or oils that are high in saturated fat. · Switch from 2% milk or whole milk to 1% or fat-free milk. · Dip raw vegetables in a vinaigrette dressing or hummus instead of dips made from mayonnaise or sour cream.  · Have a piece of fruit for dessert instead of a piece of cake. Try baked apples, or have some dried fruit. Tips for eating out  · Try broiled, grilled, baked, or poached fish instead of having it fried or breaded.   · Ask your  to have your meals prepared with olive oil instead of butter. · Order dishes made with marinara sauce or sauces made from olive oil. Avoid sauces made from cream or mayonnaise. · Choose whole-grain breads, whole wheat pasta and pizza crust, brown rice, beans, and lentils. · Cut back on butter or margarine on bread. Instead, you can dip your bread in a small amount of olive oil. · Ask for a side salad or grilled vegetables instead of french fries or chips. Where can you learn more? Go to http://morenitaOneCardelijah.info/. Enter [phone] in the search box to learn more about \"Learning About the Mediterranean Diet. \"  Current as of: May 12, 2017  Content Version: 11.4  © 2288-6530 [employer], [employer]. Care instructions adapted under license by [employer] (which disclaims liability or warranty for this information). If you have questions about a medical condition or this instruction, always ask your healthcare professional. [place] [address] any warranty or liability for your use of this information. Follow-up Disposition:  Return in about 4 months (around 7/21/2018) for F/u BP. Advised her to call back or return to office if symptoms worsen/change/persist.  Discussed expected course/resolution/complications of diagnosis in detail with patient. Medication risks/benefits/costs/interactions/alternatives discussed with patient. She was given an after visit summary which includes diagnoses, current medications, & vitals. She expressed understanding with the diagnosis and plan. This is the Subsequent Medicare Annual Wellness Exam, performed 12 months or more after the Initial AWV or the last Subsequent AWV    I have reviewed the patient's medical history in detail and updated the computerized patient record.      History     Past Medical History:   Diagnosis Date    Contact dermatitis and other eczema, due to unspecified cause     rosacea; AK's    Gout     HTN (hypertension)     Hypercholesterolemia     Pre-diabetes       Past Surgical History:   Procedure Laterality Date    COLONOSCOPY N/A 7/21/2017    COLONOSCOPY, EGD performed by Gómez Cid MD at [address] HX COLONOSCOPY  2012 2012, 2017    HX ENDOSCOPY  2017    HX HEENT  1949    T & A    HX ORTHOPAEDIC  2000    bilateral hammertoe surgery    HX ORTHOPAEDIC  2002    r rotator cuff repair     Current Outpatient Prescriptions   Medication Sig Dispense Refill    omeprazole (PRILOSEC) 20 mg capsule Take 1 Cap by mouth daily. 90 Cap 2    allopurinol (ZYLOPRIM) 300 mg tablet Take 1 Tab by mouth daily. 90 Tab 2    ferrous sulfate (IRON) 325 mg (65 mg iron) tablet Take 325 mg by mouth daily. Allergies   Allergen Reactions    Ace Inhibitors Cough    Penicillins Hives    Seafood [Shellfish Containing Products] Other (comments)     All seafood aggravates gout    Shellfish Containing Products Other (comments)     gout    Terazosin Unknown (comments)     Incont     Family History   Problem Relation Age of Onset    Cancer Mother      lung, breast    Heart Failure Father     Hypertension Father     Coronary Artery Disease Father      CABG at [de-identified]    Arthritis-osteo Sister     Hypertension Sister     Obesity Daughter     No Known Problems Son      Social History   Substance Use Topics    Smoking status: Never Smoker    Smokeless tobacco: Never Used    Alcohol use No     Patient Active Problem List   Diagnosis Code    Benign essential hypertension I10    Gout M10.9    Hypertriglyceridemia E78.1    H/O mammogram Z92.89    H/O colonoscopy Z98.890    Right bundle branch block I45.10    Low HDL (under 40) E78.6    Elevated glucose R73.09    Open angle with borderline findings, low risk H40.019    ACP (advance care planning) Z71.89    Adenocarcinoma of colon, [facility]'s C (HCC) C18.9    Severe obesity (BMI 35.0-39. 9) with comorbidity ([facility] [address] E66.01       Depression Risk Factor Screening:     PHQ over the last two weeks 6/7/2017   Little interest or pleasure in doing things Not at all   Feeling down, depressed or hopeless Not at all   Total Score PHQ 2 0     Alcohol Risk Factor Screening: You do not drink alcohol or very rarely. Functional Ability and Level of Safety:   Hearing Loss  Hearing is good. Activities of Daily Living  The home contains: no safety equipment. Patient does total self care    Fall Risk  Fall Risk Assessment, last 12 mths 6/7/2017   Able to walk? Yes   Fall in past 12 months? No       Abuse Screen  Patient is not abused    Cognitive Screening   Evaluation of Cognitive Function:  Has your family/caregiver stated any concerns about your memory: no  Normal    Patient Care Team   Patient Care Team:  Flores Ozuna MD as PCP - General (Internal Medicine)    Assessment/Plan   Education and counseling provided:  Are appropriate based on today's review and evaluation  End-of-Life planning (with patient's consent)    Diagnoses and all orders for this visit:    1. Medicare annual wellness visit, subsequent    2. Chronic gout of foot, unspecified cause, unspecified laterality  -     allopurinol (ZYLOPRIM) 300 mg tablet; Take 1 Tab by mouth daily. 3. Gastroesophageal reflux disease without esophagitis  -     omeprazole (PRILOSEC) 20 mg capsule; Take 1 Cap by mouth daily. 4. Adenocarcinoma of colon, [facility] C ([facility] [address]    5. Severe obesity (BMI 35.0-39. 9) with comorbidity ([facility] [address]    6. Elevated glucose  -     HEMOGLOBIN A1C WITH EAG; Future    7. Encounter for immunization    8. Well adult exam  -     CBC WITH AUTOMATED DIFF; Future  -     LIPID PANEL; Future  -     TSH REFLEX TO T4; Future  -     URINALYSIS W/ RFLX MICROSCOPIC; Future  -     METABOLIC PANEL, COMPREHENSIVE; Future    Other orders  -     pneumococcal 13 jania conj dip (PREVNAR-13) 0.5 mL syrg injection; 0.5 mL by IntraMUSCular route once for 1 dose.         Health Maintenance Due   Topic Date Due    Pneumococcal 65+ High/Highest Risk (2 of 2 - PCV13) 01/07/2014

## 2018-06-06 ENCOUNTER — HOSPITAL ENCOUNTER (OUTPATIENT)
Dept: LAB | Age: 73
Discharge: HOME | End: 2018-06-06
Payer: MEDICARE

## 2018-06-06 PROCEDURE — 83036 HEMOGLOBIN GLYCOSYLATED A1C: CPT

## 2018-06-06 PROCEDURE — 80061 LIPID PANEL: CPT

## 2018-06-06 PROCEDURE — 84443 ASSAY THYROID STIM HORMONE: CPT

## 2018-06-06 PROCEDURE — 81003 URINALYSIS AUTO W/O SCOPE: CPT

## 2018-06-06 PROCEDURE — 85025 COMPLETE CBC W/AUTO DIFF WBC: CPT

## 2018-06-06 PROCEDURE — 80053 COMPREHEN METABOLIC PANEL: CPT

## 2018-06-07 LAB
ALBUMIN SERPL-MCNC: 4.1 G/DL (ref 3.5–4.8)
ALBUMIN/GLOB SERPL: 1.4 {RATIO} (ref 1.2–2.2)
ALP SERPL-CCNC: 99 IU/L (ref 39–117)
ALT SERPL-CCNC: 22 IU/L (ref 0–32)
APPEARANCE UR: ABNORMAL
AST SERPL-CCNC: 34 IU/L (ref 0–40)
BASOPHILS # BLD AUTO: 0 X10E3/UL (ref 0–0.2)
BASOPHILS NFR BLD AUTO: 1 %
BILIRUB SERPL-MCNC: 0.6 MG/DL (ref 0–1.2)
BILIRUB UR QL STRIP: NEGATIVE
BUN SERPL-MCNC: 18 MG/DL (ref 8–27)
BUN/CREAT SERPL: 19 (ref 12–28)
CALCIUM SERPL-MCNC: 9.2 MG/DL (ref 8.7–10.3)
CHLORIDE SERPL-SCNC: 102 MMOL/L (ref 96–106)
CHOLEST SERPL-MCNC: 162 MG/DL (ref 100–199)
CO2 SERPL-SCNC: 23 MMOL/L (ref 18–29)
COLOR UR: YELLOW
CREAT SERPL-MCNC: 0.97 MG/DL (ref 0.57–1)
EOSINOPHIL # BLD AUTO: 0.1 X10E3/UL (ref 0–0.4)
EOSINOPHIL NFR BLD AUTO: 3 %
ERYTHROCYTE [DISTWIDTH] IN BLOOD BY AUTOMATED COUNT: 16.1 % (ref 12.3–15.4)
EST. AVERAGE GLUCOSE BLD GHB EST-MCNC: 120 MG/DL
GFR SERPLBLD CREATININE-BSD FMLA CKD-EPI: 58 ML/MIN/1.73
GFR SERPLBLD CREATININE-BSD FMLA CKD-EPI: 67 ML/MIN/1.73
GLOBULIN SER CALC-MCNC: 2.9 G/DL (ref 1.5–4.5)
GLUCOSE SERPL-MCNC: 104 MG/DL (ref 65–99)
GLUCOSE UR QL: NEGATIVE
HBA1C MFR BLD: 5.8 % (ref 4.8–5.6)
HCT VFR BLD AUTO: 44.6 % (ref 34–46.6)
HDLC SERPL-MCNC: 38 MG/DL
HGB BLD-MCNC: 14.5 G/DL (ref 11.1–15.9)
HGB UR QL STRIP: NEGATIVE
IMM GRANULOCYTES # BLD: 0 X10E3/UL (ref 0–0.1)
IMM GRANULOCYTES NFR BLD: 0 %
INTERPRETATION, 910389: NORMAL
INTERPRETATION: NORMAL
KETONES UR QL STRIP: NEGATIVE
LDLC SERPL CALC-MCNC: 87 MG/DL (ref 0–99)
LEUKOCYTE ESTERASE UR QL STRIP: NEGATIVE
LYMPHOCYTES # BLD AUTO: 1.5 X10E3/UL (ref 0.7–3.1)
LYMPHOCYTES NFR BLD AUTO: 41 %
MCH RBC QN AUTO: 28.4 PG (ref 26.6–33)
MCHC RBC AUTO-ENTMCNC: 32.5 G/DL (ref 31.5–35.7)
MCV RBC AUTO: 87 FL (ref 79–97)
MICRO URNS: ABNORMAL
MONOCYTES # BLD AUTO: 0.3 X10E3/UL (ref 0.1–0.9)
MONOCYTES NFR BLD AUTO: 8 %
NEUTROPHILS # BLD AUTO: 1.8 X10E3/UL (ref 1.4–7)
NEUTROPHILS NFR BLD AUTO: 47 %
NITRITE UR QL STRIP: NEGATIVE
PDF IMAGE, 910387: NORMAL
PH UR STRIP: 5 [PH] (ref 5–7.5)
PLATELET # BLD AUTO: 188 X10E3/UL (ref 150–379)
POTASSIUM SERPL-SCNC: 4.1 MMOL/L (ref 3.5–5.2)
PROT SERPL-MCNC: 7 G/DL (ref 6–8.5)
PROT UR QL STRIP: NEGATIVE
RBC # BLD AUTO: 5.11 X10E6/UL (ref 3.77–5.28)
SODIUM SERPL-SCNC: 140 MMOL/L (ref 134–144)
SP GR UR: 1.02 (ref 1–1.03)
TRIGL SERPL-MCNC: 185 MG/DL (ref 0–149)
TSH SERPL DL<=0.005 MIU/L-ACNC: 1.65 UIU/ML (ref 0.45–4.5)
UROBILINOGEN UR STRIP-MCNC: 0.2 MG/DL (ref 0.2–1)
VLDLC SERPL CALC-MCNC: 37 MG/DL (ref 5–40)
WBC # BLD AUTO: 3.7 X10E3/UL (ref 3.4–10.8)

## 2018-12-22 DIAGNOSIS — M1A.0790 CHRONIC GOUT OF FOOT, UNSPECIFIED CAUSE, UNSPECIFIED LATERALITY: ICD-10-CM

## 2018-12-22 DIAGNOSIS — K21.9 GASTROESOPHAGEAL REFLUX DISEASE WITHOUT ESOPHAGITIS: ICD-10-CM

## 2018-12-23 DIAGNOSIS — M1A.0790 CHRONIC GOUT OF FOOT, UNSPECIFIED CAUSE, UNSPECIFIED LATERALITY: ICD-10-CM

## 2018-12-23 DIAGNOSIS — K21.9 GASTROESOPHAGEAL REFLUX DISEASE WITHOUT ESOPHAGITIS: ICD-10-CM

## 2018-12-23 RX ORDER — ALLOPURINOL 300 MG/1
TABLET ORAL
Qty: 90 TAB | Refills: 2 | Status: SHIPPED | OUTPATIENT
Start: 2018-12-23 | End: 2018-12-24 | Stop reason: SDUPTHER

## 2018-12-23 RX ORDER — OMEPRAZOLE 20 MG/1
CAPSULE, DELAYED RELEASE ORAL
Qty: 90 CAP | Refills: 2 | Status: SHIPPED | OUTPATIENT
Start: 2018-12-23 | End: 2018-12-24 | Stop reason: SDUPTHER

## 2018-12-24 RX ORDER — ALLOPURINOL 300 MG/1
300 TABLET ORAL DAILY
Qty: 90 TAB | Refills: 1 | Status: SHIPPED | OUTPATIENT
Start: 2018-12-24 | End: 2020-03-08 | Stop reason: SDUPTHER

## 2018-12-24 RX ORDER — OMEPRAZOLE 20 MG/1
20 CAPSULE, DELAYED RELEASE ORAL DAILY
Qty: 90 CAP | Refills: 1 | Status: SHIPPED | OUTPATIENT
Start: 2018-12-24 | End: 2019-11-13 | Stop reason: SDUPTHER

## 2019-04-25 ENCOUNTER — OFFICE VISIT (OUTPATIENT)
Dept: PRIMARY CARE CLINIC | Age: 74
End: 2019-04-25

## 2019-04-25 VITALS
BODY MASS INDEX: 38.94 KG/M2 | HEART RATE: 85 BPM | HEIGHT: 62 IN | WEIGHT: 211.6 LBS | RESPIRATION RATE: 18 BRPM | DIASTOLIC BLOOD PRESSURE: 74 MMHG | TEMPERATURE: 98.4 F | SYSTOLIC BLOOD PRESSURE: 132 MMHG | OXYGEN SATURATION: 96 %

## 2019-04-25 DIAGNOSIS — M1A.0790 CHRONIC GOUT OF FOOT, UNSPECIFIED CAUSE, UNSPECIFIED LATERALITY: ICD-10-CM

## 2019-04-25 DIAGNOSIS — I10 BENIGN ESSENTIAL HYPERTENSION: Primary | ICD-10-CM

## 2019-04-25 DIAGNOSIS — Z76.89 ESTABLISHING CARE WITH NEW DOCTOR, ENCOUNTER FOR: ICD-10-CM

## 2019-04-25 RX ORDER — VALSARTAN 160 MG/1
TABLET ORAL
Refills: 0 | COMMUNITY
Start: 2019-04-12 | End: 2019-04-25 | Stop reason: SDUPTHER

## 2019-04-25 RX ORDER — AMLODIPINE BESYLATE 10 MG/1
TABLET ORAL
Qty: 90 TAB | Refills: 0 | Status: SHIPPED | OUTPATIENT
Start: 2019-04-25 | End: 2019-07-29 | Stop reason: SDUPTHER

## 2019-04-25 RX ORDER — VALSARTAN 160 MG/1
160 TABLET ORAL DAILY
Qty: 90 TAB | Refills: 0 | Status: SHIPPED | OUTPATIENT
Start: 2019-04-25 | End: 2019-07-29 | Stop reason: SDUPTHER

## 2019-04-25 RX ORDER — AMLODIPINE BESYLATE 10 MG/1
TABLET ORAL
Refills: 0 | COMMUNITY
Start: 2019-04-12 | End: 2019-04-25 | Stop reason: SDUPTHER

## 2019-04-25 NOTE — PROGRESS NOTES
Subjective: Chief Complaint Patient presents with Stephany Hatch Establish Care  Elevated Blood Pressure  
  used to be on BP medication but when she had colon cancer, BP was very low and cardiologist took her off. Went to OB last week and BP was 194/106. Went to Er and was prescribed valsartan and amlodipine but BP is still high She  is a 75y.o. year old pleasant  female who presents today as a new patient to establish as well as follow up on blood pressure. Patient reports that she used be on blood pressure medication atenolol, Amlodipine and Valsartan for years but in 2017 her cardiologist stopped the meds due to low blood pressure. About two weeks ago she went  to [facility] gyn office where her BP was  216/126. She was asked to got to ER but she did not to ER that day. Next day her BP was still very elevated so, she went to [facility] about two weeks ago for elevated blood pressure. In the ER she was restarted on Amlodipine 10 mg, valsartan 160 mg. She also had EKG and some blood work done which all came back normal. Reports that she was completely asymptomatic. She is here for follow up on blood pressure. Her medical Hx is significant for gout as well. Usually triggers by sea food she states . She is on Allopurinol 300 mg daily. A comprehensive review of systems was negative except for that written in the HPI. Objective:  
 
Vitals:  
 04/25/19 1457 BP: 132/74 Pulse: 85 Resp: 18 Temp: 98.4 °F (36.9 °C) TempSrc: Oral  
SpO2: 96% Weight: 211 lb 9.6 oz (96 kg) Height: 5' 2\" (1.575 m) Physical Examination: General appearance - alert, well appearing, and in no distress, oriented to person, place, and time and overweight Mental status - alert, oriented to person, place, and time, normal mood, behavior, speech, dress, motor activity, and thought processes Ears - bilateral TM's and external ear canals normal 
Mouth - mucous membranes moist, pharynx normal without lesions Neck - supple, no significant adenopathy, thyroid exam: thyroid is normal in size without nodules or tenderness Chest - clear to auscultation, no wheezes, rales or rhonchi, symmetric air entry Heart - normal rate, regular rhythm, normal S1, S2, no murmurs, rubs, clicks or gallops Neurological - alert, oriented, normal speech, no focal findings or movement disorder noted Extremities - pedal edema 1 + BL Allergies Allergen Reactions  Ace Inhibitors Cough  Penicillins Hives  Seafood [Shellfish Containing Products] Other (comments) All seafood aggravates gout  Shellfish Containing Products Other (comments)  
  gout  Terazosin Unknown (comments) Incont Social History Socioeconomic History  Marital status:  Spouse name: Not on file  Number of children: Not on file  Years of education: Not on file  Highest education level: Not on file Tobacco Use  Smoking status: Never Smoker  Smokeless tobacco: Never Used Substance and Sexual Activity  Alcohol use: No  
 Drug use: No  
 Sexual activity: Not Currently Partners: Male Other Topics Concern  Caffeine Concern No  
  Comment: avoids caffeine Social History Narrative ** Merged History Encounter ** Family History Problem Relation Age of Onset  Cancer Mother   
     lung, breast  
 Heart Failure Father  Hypertension Father  Coronary Artery Disease Father CABG at [de-identified]  Arthritis-osteo Sister  Hypertension Sister  Obesity Daughter  No Known Problems Son Past Surgical History:  
Procedure Laterality Date  COLONOSCOPY N/A 7/21/2017 COLONOSCOPY, EGD performed by Marissa Odom MD at [address] HX COLONOSCOPY  2012 2012, 2017  HX ENDOSCOPY  2017 [facility] T & A  
 HX ORTHOPAEDIC  2000  
 bilateral hammertoe surgery  HX ORTHOPAEDIC  2002  
 r rotator cuff repair Past Medical History:  
Diagnosis Date  Cancer ([facility] [address]   
 parial colocetomy in 8.2017  Contact dermatitis and other eczema, due to unspecified cause   
 rosacea; AK's  Gout   
 HTN (hypertension)  Hypercholesterolemia  Pre-diabetes Current Outpatient Medications Medication Sig Dispense Refill  valsartan (DIOVAN) 160 mg tablet TAKE 1 TABLET BY MOUTH EVERY DAY  0  
 amLODIPine (NORVASC) 10 mg tablet TAKE 1 TABLET BY MOUTH EVERY DAY  0  
 omeprazole (PRILOSEC) 20 mg capsule Take 1 Cap by mouth daily. 90 Cap 1  
 allopurinol (ZYLOPRIM) 300 mg tablet Take 1 Tab by mouth daily. 90 Tab 1 Assessment/ Plan:  
Diagnoses and all orders for this visit: 1. Benign essential hypertension -    BP is well controlled with current med. -    amLODIPine (NORVASC) 10 mg tablet; TAKE 1 TABLET BY MOUTH EVERY DAY 
-     valsartan (DIOVAN) 160 mg tablet; Take 1 Tab by mouth daily. 
-     METABOLIC PANEL, BASIC 2. Chronic gout of foot, unspecified cause, unspecified laterality -  Patient is currently on Allopurinol 300 mg daily. Its been a while she did not have any gout flare up. She was wondering if she can cut down the med  or see taking every other day will work the same or not. Advised that she can try taking the med every other day, if any flare up then she can go back to daily. We can cut down to 200 mg daily as well if needed. 3. Establishing care with new doctor, encounter for Same as above. Medication risks/benefits/costs/interactions/alternatives discussed with patient. Advised patient to call back or return to office if symptoms worsen/change/persist. If patient cannot reach us or should anything more severe/urgent arise he/she should proceed directly to the nearest emergency department. Discussed expected course/resolution/complications of diagnosis in detail with patient. Patient given a written after visit summary which includes her diagnoses, current medications and vitals. Patient expressed understanding with the diagnosis and plan. Follow-up and Dispositions · Return in about 1 month (around 5/25/2019) for medicare  well ness as well as fasting blood work. Jocelyn Brewer

## 2019-04-26 LAB
BUN SERPL-MCNC: 18 MG/DL (ref 8–27)
BUN/CREAT SERPL: 19 (ref 12–28)
CALCIUM SERPL-MCNC: 9.4 MG/DL (ref 8.7–10.3)
CHLORIDE SERPL-SCNC: 102 MMOL/L (ref 96–106)
CO2 SERPL-SCNC: 25 MMOL/L (ref 20–29)
CREAT SERPL-MCNC: 0.93 MG/DL (ref 0.57–1)
GLUCOSE SERPL-MCNC: 161 MG/DL (ref 65–99)
POTASSIUM SERPL-SCNC: 4.6 MMOL/L (ref 3.5–5.2)
SODIUM SERPL-SCNC: 141 MMOL/L (ref 134–144)

## 2019-04-26 NOTE — PROGRESS NOTES
Result sent through my chart. Adam Ms. Brad Hernandez, Hope you are doing well. Your recent Kidney function is normal.  
Blood sugar level is in the higher range. Will have a diabetic screen in next appointment. Please let me know if you have any questions. Thanks. Dr. Fabien Carcamo

## 2019-06-19 ENCOUNTER — OFFICE VISIT (OUTPATIENT)
Dept: PRIMARY CARE CLINIC | Age: 74
End: 2019-06-19

## 2019-06-19 VITALS
TEMPERATURE: 97.7 F | WEIGHT: 216 LBS | RESPIRATION RATE: 17 BRPM | HEIGHT: 62 IN | SYSTOLIC BLOOD PRESSURE: 116 MMHG | OXYGEN SATURATION: 95 % | BODY MASS INDEX: 39.75 KG/M2 | HEART RATE: 78 BPM | DIASTOLIC BLOOD PRESSURE: 74 MMHG

## 2019-06-19 DIAGNOSIS — R73.09 ELEVATED GLYCOSYLATED HEMOGLOBIN: ICD-10-CM

## 2019-06-19 DIAGNOSIS — Z66 DNR (DO NOT RESUSCITATE): ICD-10-CM

## 2019-06-19 DIAGNOSIS — Z23 ENCOUNTER FOR IMMUNIZATION: ICD-10-CM

## 2019-06-19 DIAGNOSIS — Z00.00 MEDICARE ANNUAL WELLNESS VISIT, SUBSEQUENT: Primary | ICD-10-CM

## 2019-06-19 DIAGNOSIS — I10 BENIGN ESSENTIAL HYPERTENSION: ICD-10-CM

## 2019-06-19 DIAGNOSIS — Z13.39 SCREENING FOR ALCOHOLISM: ICD-10-CM

## 2019-06-19 DIAGNOSIS — Z13.31 SCREENING FOR DEPRESSION: ICD-10-CM

## 2019-06-19 NOTE — PROGRESS NOTES
This is the Subsequent Medicare Annual Wellness Exam, performed 12 months or more after the Initial AWV or the last Subsequent AWV    I have reviewed the patient's medical history in detail and updated the computerized patient record. 74 y/o pleasant female is here for follow up on blood pressure as well as medicare well ness. BP has been well controlled with amlodipine 10 mg and valsartan 160 mg. Denies any chest pain, soa, cough. Gout been stable with Allopurinol. Lab Results   Component Value Date/Time    Hemoglobin A1c 5.8 (H) 06/06/2018 12:00 AM         History     Past Medical History:   Diagnosis Date    Cancer ([address]     parial colocetomy in 8.2017    Contact dermatitis and other eczema, due to unspecified cause     rosacea; AK's    Gout     HTN (hypertension)     Hypercholesterolemia     Pre-diabetes       Past Surgical History:   Procedure Laterality Date    ABDOMEN SURGERY PROC UNLISTED      colectomy in 2017    COLONOSCOPY N/A 7/21/2017    COLONOSCOPY, EGD performed by Levy Montes MD at [facility] HX COLONOSCOPY  2012 2012, 2017    HX ENDOSCOPY  2017    HX HEENT  1949    T & A    HX ORTHOPAEDIC  2000    bilateral hammertoe surgery    HX ORTHOPAEDIC  2002    r rotator cuff repair     Current Outpatient Medications   Medication Sig Dispense Refill    amLODIPine (NORVASC) 10 mg tablet TAKE 1 TABLET BY MOUTH EVERY DAY 90 Tab 0    valsartan (DIOVAN) 160 mg tablet Take 1 Tab by mouth daily. 90 Tab 0    omeprazole (PRILOSEC) 20 mg capsule Take 1 Cap by mouth daily. 90 Cap 1    allopurinol (ZYLOPRIM) 300 mg tablet Take 1 Tab by mouth daily.  90 Tab 1     Allergies   Allergen Reactions    Ace Inhibitors Cough    Penicillins Hives    Seafood [Shellfish Containing Products] Other (comments)     All seafood aggravates gout    Shellfish Containing Products Other (comments)     gout    Terazosin Unknown (comments)     Incont     Family History   Problem Relation Age of Onset    Cancer Mother         lung, breast    Heart Failure Father     Hypertension Father     Coronary Artery Disease Father         CABG at [de-identified]    Arthritis-osteo Sister    Carel.Monas Hypertension Sister     Obesity Daughter     No Known Problems Son      Social History     Tobacco Use    Smoking status: Never Smoker    Smokeless tobacco: Never Used   Substance Use Topics    Alcohol use: No     Patient Active Problem List   Diagnosis Code    Benign essential hypertension I10    Gout M10.9    Hypertriglyceridemia E78.1    H/O mammogram Z92.89    H/O colonoscopy Z98.890    Right bundle branch block I45.10    Low HDL (under 40) E78.6    Elevated glucose R73.09    Open angle with borderline findings, low risk H40.019    ACP (advance care planning) Z71.89    Adenocarcinoma of colon, [facility] C ([facility] C18.9    Severe obesity (BMI 35.0-39. 9) with comorbidity ([facility] E66.01       Depression Risk Factor Screening:     3 most recent PHQ Screens 6/19/2019   Little interest or pleasure in doing things Not at all   Feeling down, depressed, irritable, or hopeless Not at all   Total Score PHQ 2 0     Alcohol Risk Factor Screening: You do not drink alcohol or very rarely. Functional Ability and Level of Safety:   Hearing Loss  Hearing is good. Activities of Daily Living  The home contains: handrails and rugs  Patient does total self care    Fall Risk  Fall Risk Assessment, last 12 mths 6/19/2019   Able to walk? Yes   Fall in past 12 months?  No       Abuse Screen  Patient is not abused     General: stated age, well developed, well nourished and in NAD  Neck: supple, symmetrical, trachea midline, no adenopathy and thyroid: not enlarged, symmetric, no tenderness/mass/nodules  Lungs:  clear to auscultation w/o rales, rhonchi, wheezes w/normal effort and no use of accessory muscles of respiration   Heart: regular rate and rhythm, S1, S2 normal, no murmur, click, rub or gallop  Abdomen: soft, nontender, no masses, BS normal  Ext:  No edema noted. Lymph: no cervical adenopathy appreciated  Skin:  Normal. and no rash . Patient has multiple moles. Advised to follow up with dermatologist.    Psych: alert and oriented to person, place, time and situation and Speech: appropriate quality, quantity and organization of sentences        Cognitive Screening   Evaluation of Cognitive Function:  Has your family/caregiver stated any concerns about your memory: no  Normal    Patient Care Team   Patient Care Team:  Adarsh Fischer MD as PCP - General (Family Practice)  Cecilio Espinoza, DO (Obstetrics & Gynecology)    Assessment/Plan   Education and counseling provided:  Are appropriate based on today's review and evaluation  End-of-Life planning (with patient's consent)  Pneumococcal Vaccine  Screening Mammography  Screening Pap and pelvic (covered once every 2 years)  Colorectal cancer screening tests  Bone mass measurement (DEXA)  Screening for glaucoma    Diagnoses and all orders for this visit:    1. Medicare annual wellness visit, subsequent  -     CO ANNUAL ALCOHOL SCREEN [address] [facility],  [address]  -     PNEUMOCOCCAL CONJ VACCINE 13 VALENT IM  -     CBC WITH AUTOMATED DIFF    2. Benign essential hypertension  -   BP well controled with current med. -      LIPID PANEL  -     METABOLIC PANEL, COMPREHENSIVE    3. Elevated glycosylated hemoglobin  -     HEMOGLOBIN A1C WITH EAG              Counseled on water aerobic exercise. 4. Screening for alcoholism  -     CO ANNUAL ALCOHOL SCREEN 15 MIN    5. Screening for depression  -     [facility]              Denies any depression. 6. Encounter for immunization  -     ADMIN PNEUMOCOCCAL VACCINE  -     PNEUMOCOCCAL CONJ VACCINE 13 VALENT IM    7. DNR (do not resuscitate)  -     DO NOT RESUSCITATE    Advised to get shingle vaccine from local pharmacy.      Health Maintenance Due   Topic Date Due    Shingrix Vaccine Age 50> (1 of 2) 12/04/1994    BREAST CANCER SCRN MAMMOGRAM  08/29/2019     Follow-up and Dispositions    · Return in about 6 months (around 12/19/2019) for BP.

## 2019-06-19 NOTE — PATIENT INSTRUCTIONS
Medicare Wellness Visit, Female The best way to live healthy is to have a lifestyle where you eat a well-balanced diet, exercise regularly, limit alcohol use, and quit all forms of tobacco/nicotine, if applicable. Regular preventive services are another way to keep healthy. Preventive services (vaccines, screening tests, monitoring & exams) can help personalize your care plan, which helps you manage your own care. Screening tests can find health problems at the earliest stages, when they are easiest to treat. [facility] follows the current, evidence-based guidelines published by the [facility] ([facility]) when recommending preventive services for our patients. Because we follow these guidelines, sometimes recommendations change over time as research supports it. (For example, mammograms used to be recommended annually. Even though Medicare will still pay for an annual mammogram, the newer guidelines recommend a mammogram every two years for women of average risk.) Of course, you and your doctor may decide to screen more often for some diseases, based on your risk and your health status. Preventive services for you include: - Medicare offers their members a free annual wellness visit, which is time for you and your primary care provider to discuss and plan for your preventive service needs. Take advantage of this benefit every year! 
-All adults over the age of 72 should receive the recommended pneumonia vaccines. Current USPSTF guidelines recommend a series of two vaccines for the best pneumonia protection.  
-All adults should have a flu vaccine yearly and a tetanus vaccine every 10 years. All adults age 61 and older should receive a shingles vaccine once in their lifetime.   
-A bone mass density test is recommended when a woman turns 65 to screen for osteoporosis. This test is only recommended one time, as a screening. Some providers will use this same test as a disease monitoring tool if you already have osteoporosis. -All adults age 38-68 who are overweight should have a diabetes screening test once every three years.  
-Other screening tests and preventive services for persons with diabetes include: an eye exam to screen for diabetic retinopathy, a kidney function test, a foot exam, and stricter control over your cholesterol.  
-Cardiovascular screening for adults with routine risk involves an electrocardiogram (ECG) at intervals determined by your doctor.  
-Colorectal cancer screenings should be done for adults age 54-65 with no increased risk factors for colorectal cancer. There are a number of acceptable methods of screening for this type of cancer. Each test has its own benefits and drawbacks. Discuss with your doctor what is most appropriate for you during your annual wellness visit. The different tests include: colonoscopy (considered the best screening method), a fecal occult blood test, a fecal DNA test, and sigmoidoscopy. -Breast cancer screenings are recommended every other year for women of normal risk, age 54-69. 
-Cervical cancer screenings for women over age 72 are only recommended with certain risk factors.  
-All adults born between [facility] should be screened once for Hepatitis C. Here is a list of your current Health Maintenance items (your personalized list of preventive services) with a due date: 
Health Maintenance Due Topic Date Due  Shingles Vaccine (1 of 2) 12/04/1994  Pneumococcal Vaccine (2 of 2 - PCV13) 01/07/2014 [address] [place] [address] Annual Well Visit  03/22/2019  Mammogram  08/29/2019

## 2019-06-20 LAB
ALBUMIN SERPL-MCNC: 4.1 G/DL (ref 3.5–4.8)
ALBUMIN/GLOB SERPL: 1.6 {RATIO} (ref 1.2–2.2)
ALP SERPL-CCNC: 92 IU/L (ref 39–117)
ALT SERPL-CCNC: 22 IU/L (ref 0–32)
AST SERPL-CCNC: 41 IU/L (ref 0–40)
BASOPHILS # BLD AUTO: 0 X10E3/UL (ref 0–0.2)
BASOPHILS NFR BLD AUTO: 1 %
BILIRUB SERPL-MCNC: 0.7 MG/DL (ref 0–1.2)
BUN SERPL-MCNC: 15 MG/DL (ref 8–27)
BUN/CREAT SERPL: 19 (ref 12–28)
CALCIUM SERPL-MCNC: 9.1 MG/DL (ref 8.7–10.3)
CHLORIDE SERPL-SCNC: 100 MMOL/L (ref 96–106)
CHOLEST SERPL-MCNC: 146 MG/DL (ref 100–199)
CO2 SERPL-SCNC: 26 MMOL/L (ref 20–29)
CREAT SERPL-MCNC: 0.79 MG/DL (ref 0.57–1)
EOSINOPHIL # BLD AUTO: 0.1 X10E3/UL (ref 0–0.4)
EOSINOPHIL NFR BLD AUTO: 2 %
ERYTHROCYTE [DISTWIDTH] IN BLOOD BY AUTOMATED COUNT: 13.9 % (ref 12.3–15.4)
EST. AVERAGE GLUCOSE BLD GHB EST-MCNC: 128 MG/DL
GLOBULIN SER CALC-MCNC: 2.6 G/DL (ref 1.5–4.5)
GLUCOSE SERPL-MCNC: 154 MG/DL (ref 65–99)
HBA1C MFR BLD: 6.1 % (ref 4.8–5.6)
HCT VFR BLD AUTO: 43.1 % (ref 34–46.6)
HDLC SERPL-MCNC: 38 MG/DL
HGB BLD-MCNC: 14.6 G/DL (ref 11.1–15.9)
IMM GRANULOCYTES # BLD AUTO: 0 X10E3/UL (ref 0–0.1)
IMM GRANULOCYTES NFR BLD AUTO: 1 %
LDLC SERPL CALC-MCNC: 73 MG/DL (ref 0–99)
LYMPHOCYTES # BLD AUTO: 1.3 X10E3/UL (ref 0.7–3.1)
LYMPHOCYTES NFR BLD AUTO: 35 %
MCH RBC QN AUTO: 31.6 PG (ref 26.6–33)
MCHC RBC AUTO-ENTMCNC: 33.9 G/DL (ref 31.5–35.7)
MCV RBC AUTO: 93 FL (ref 79–97)
MONOCYTES # BLD AUTO: 0.3 X10E3/UL (ref 0.1–0.9)
MONOCYTES NFR BLD AUTO: 7 %
NEUTROPHILS # BLD AUTO: 2.1 X10E3/UL (ref 1.4–7)
NEUTROPHILS NFR BLD AUTO: 54 %
PLATELET # BLD AUTO: 151 X10E3/UL (ref 150–450)
POTASSIUM SERPL-SCNC: 4.4 MMOL/L (ref 3.5–5.2)
PROT SERPL-MCNC: 6.7 G/DL (ref 6–8.5)
RBC # BLD AUTO: 4.62 X10E6/UL (ref 3.77–5.28)
SODIUM SERPL-SCNC: 137 MMOL/L (ref 134–144)
TRIGL SERPL-MCNC: 174 MG/DL (ref 0–149)
VLDLC SERPL CALC-MCNC: 35 MG/DL (ref 5–40)
WBC # BLD AUTO: 3.8 X10E3/UL (ref 3.4–10.8)

## 2019-07-28 DIAGNOSIS — I10 BENIGN ESSENTIAL HYPERTENSION: ICD-10-CM

## 2019-07-29 RX ORDER — AMLODIPINE BESYLATE 10 MG/1
TABLET ORAL
Qty: 90 TAB | Refills: 0 | Status: SHIPPED | OUTPATIENT
Start: 2019-07-29 | End: 2019-10-29 | Stop reason: SDUPTHER

## 2019-07-29 RX ORDER — VALSARTAN 160 MG/1
160 TABLET ORAL DAILY
Qty: 90 TAB | Refills: 0 | Status: SHIPPED | OUTPATIENT
Start: 2019-07-29 | End: 2019-09-24

## 2019-09-24 DIAGNOSIS — I10 BENIGN ESSENTIAL HYPERTENSION: Primary | ICD-10-CM

## 2019-09-24 RX ORDER — LOSARTAN POTASSIUM 100 MG/1
100 TABLET ORAL DAILY
Qty: 90 TAB | Refills: 0 | Status: SHIPPED | OUTPATIENT
Start: 2019-09-24 | End: 2019-11-14

## 2019-10-23 DIAGNOSIS — I10 BENIGN ESSENTIAL HYPERTENSION: Primary | ICD-10-CM

## 2019-10-23 RX ORDER — HYDROCHLOROTHIAZIDE 12.5 MG/1
12.5 TABLET ORAL DAILY
Qty: 30 TAB | Refills: 2 | Status: SHIPPED | OUTPATIENT
Start: 2019-10-23 | End: 2020-07-16

## 2019-10-29 DIAGNOSIS — I10 BENIGN ESSENTIAL HYPERTENSION: ICD-10-CM

## 2019-10-29 RX ORDER — AMLODIPINE BESYLATE 10 MG/1
TABLET ORAL
Qty: 90 TAB | Refills: 0 | Status: SHIPPED | OUTPATIENT
Start: 2019-10-29 | End: 2019-11-13 | Stop reason: SDUPTHER

## 2019-11-13 DIAGNOSIS — K21.9 GASTROESOPHAGEAL REFLUX DISEASE WITHOUT ESOPHAGITIS: ICD-10-CM

## 2019-11-13 DIAGNOSIS — I10 BENIGN ESSENTIAL HYPERTENSION: ICD-10-CM

## 2019-11-13 RX ORDER — AMLODIPINE BESYLATE 10 MG/1
TABLET ORAL
Qty: 90 TAB | Refills: 0 | Status: SHIPPED | OUTPATIENT
Start: 2019-11-13 | End: 2020-03-08 | Stop reason: SDUPTHER

## 2019-11-14 RX ORDER — OMEPRAZOLE 20 MG/1
20 CAPSULE, DELAYED RELEASE ORAL DAILY
Qty: 90 CAP | Refills: 1 | Status: SHIPPED | OUTPATIENT
Start: 2019-11-14 | End: 2020-07-16 | Stop reason: SDUPTHER

## 2019-11-14 RX ORDER — VALSARTAN 160 MG/1
160 TABLET ORAL DAILY
Qty: 90 TAB | Refills: 0 | Status: SHIPPED | OUTPATIENT
Start: 2019-11-14 | End: 2020-03-08 | Stop reason: SDUPTHER

## 2020-03-08 DIAGNOSIS — M1A.0790 CHRONIC GOUT OF FOOT, UNSPECIFIED CAUSE, UNSPECIFIED LATERALITY: ICD-10-CM

## 2020-03-08 DIAGNOSIS — I10 BENIGN ESSENTIAL HYPERTENSION: ICD-10-CM

## 2020-03-09 RX ORDER — VALSARTAN 160 MG/1
160 TABLET ORAL DAILY
Qty: 90 TAB | Refills: 0 | Status: SHIPPED | OUTPATIENT
Start: 2020-03-09 | End: 2020-06-08 | Stop reason: SDUPTHER

## 2020-03-09 RX ORDER — AMLODIPINE BESYLATE 10 MG/1
TABLET ORAL
Qty: 90 TAB | Refills: 0 | Status: SHIPPED | OUTPATIENT
Start: 2020-03-09 | End: 2020-06-08 | Stop reason: SDUPTHER

## 2020-03-09 RX ORDER — ALLOPURINOL 300 MG/1
300 TABLET ORAL DAILY
Qty: 90 TAB | Refills: 1 | Status: SHIPPED | OUTPATIENT
Start: 2020-03-09 | End: 2021-03-10 | Stop reason: SDUPTHER

## 2020-06-08 ENCOUNTER — TELEPHONE (OUTPATIENT)
Dept: PRIMARY CARE CLINIC | Age: 75
End: 2020-06-08

## 2020-06-08 DIAGNOSIS — I10 BENIGN ESSENTIAL HYPERTENSION: ICD-10-CM

## 2020-06-09 RX ORDER — VALSARTAN 160 MG/1
160 TABLET ORAL DAILY
Qty: 15 TAB | Refills: 0 | Status: SHIPPED | OUTPATIENT
Start: 2020-06-09 | End: 2020-06-10 | Stop reason: SDUPTHER

## 2020-06-09 RX ORDER — AMLODIPINE BESYLATE 10 MG/1
TABLET ORAL
Qty: 15 TAB | Refills: 0 | Status: SHIPPED | OUTPATIENT
Start: 2020-06-09 | End: 2020-07-16 | Stop reason: SDUPTHER

## 2020-06-10 DIAGNOSIS — I10 BENIGN ESSENTIAL HYPERTENSION: ICD-10-CM

## 2020-06-10 RX ORDER — VALSARTAN 160 MG/1
160 TABLET ORAL DAILY
Qty: 90 TAB | Refills: 0 | Status: SHIPPED | OUTPATIENT
Start: 2020-06-10 | End: 2020-07-16 | Stop reason: SDUPTHER

## 2020-06-10 NOTE — TELEPHONE ENCOUNTER
Patient would like to obtain a full rx for her BP meds. Patient has refused to schedule for sooner visit for follow up regarding her BP. She has appt scheduled for July for her medicare wellness visit. She states she is only in need of seeing you once per year.  Please advise

## 2020-07-16 ENCOUNTER — OFFICE VISIT (OUTPATIENT)
Dept: PRIMARY CARE CLINIC | Age: 75
End: 2020-07-16

## 2020-07-16 VITALS
SYSTOLIC BLOOD PRESSURE: 139 MMHG | TEMPERATURE: 97.2 F | WEIGHT: 212.8 LBS | BODY MASS INDEX: 39.16 KG/M2 | RESPIRATION RATE: 16 BRPM | HEART RATE: 77 BPM | DIASTOLIC BLOOD PRESSURE: 77 MMHG | OXYGEN SATURATION: 93 % | HEIGHT: 62 IN

## 2020-07-16 DIAGNOSIS — Z13.31 SCREENING FOR DEPRESSION: ICD-10-CM

## 2020-07-16 DIAGNOSIS — K21.9 GASTROESOPHAGEAL REFLUX DISEASE WITHOUT ESOPHAGITIS: ICD-10-CM

## 2020-07-16 DIAGNOSIS — E66.01 SEVERE OBESITY (BMI 35.0-39.9) WITH COMORBIDITY (HCC): ICD-10-CM

## 2020-07-16 DIAGNOSIS — E78.1 HYPERTRIGLYCERIDEMIA: ICD-10-CM

## 2020-07-16 DIAGNOSIS — Z66 ADVANCE DIRECTIVE INDICATES PATIENT WISH FOR DO-NOT-INTUBATE RESUSCITATION STATUS: ICD-10-CM

## 2020-07-16 DIAGNOSIS — R73.09 ELEVATED GLYCOSYLATED HEMOGLOBIN: ICD-10-CM

## 2020-07-16 DIAGNOSIS — C18.9 ADENOCARCINOMA OF COLON, DUKE'S C (HCC): ICD-10-CM

## 2020-07-16 DIAGNOSIS — Z00.00 MEDICARE ANNUAL WELLNESS VISIT, SUBSEQUENT: Primary | ICD-10-CM

## 2020-07-16 DIAGNOSIS — I10 BENIGN ESSENTIAL HYPERTENSION: ICD-10-CM

## 2020-07-16 RX ORDER — VALSARTAN 160 MG/1
160 TABLET ORAL DAILY
Qty: 90 TAB | Refills: 1 | Status: SHIPPED | OUTPATIENT
Start: 2020-07-16 | End: 2020-08-29 | Stop reason: SDUPTHER

## 2020-07-16 RX ORDER — AMLODIPINE BESYLATE 10 MG/1
TABLET ORAL
Qty: 90 TAB | Refills: 1 | Status: SHIPPED | OUTPATIENT
Start: 2020-07-16 | End: 2020-12-11 | Stop reason: SDUPTHER

## 2020-07-16 RX ORDER — OMEPRAZOLE 20 MG/1
20 CAPSULE, DELAYED RELEASE ORAL DAILY
Qty: 90 CAP | Refills: 1 | Status: SHIPPED | OUTPATIENT
Start: 2020-07-16 | End: 2020-08-29 | Stop reason: SDUPTHER

## 2020-07-16 NOTE — PROGRESS NOTES
Chief Complaint   Patient presents with   [facility] Annual Wellness Visit     Patient presents today for her annual wellness visit

## 2020-07-16 NOTE — PATIENT INSTRUCTIONS
Medicare Wellness Visit, Female     The best way to live healthy is to have a lifestyle where you eat a well-balanced diet, exercise regularly, limit alcohol use, and quit all forms of tobacco/nicotine, if applicable. Regular preventive services are another way to keep healthy. Preventive services (vaccines, screening tests, monitoring & exams) can help personalize your care plan, which helps you manage your own care. Screening tests can find health problems at the earliest stages, when they are easiest to treat. [facility] follows the current, evidence-based guidelines published by the [facility] ([facility]) when recommending preventive services for our patients. Because we follow these guidelines, sometimes recommendations change over time as research supports it. (For example, mammograms used to be recommended annually. Even though Medicare will still pay for an annual mammogram, the newer guidelines recommend a mammogram every two years for women of average risk). Of course, you and your doctor may decide to screen more often for some diseases, based on your risk and your co-morbidities (chronic disease you are already diagnosed with). Preventive services for you include:  - Medicare offers their members a free annual wellness visit, which is time for you and your primary care provider to discuss and plan for your preventive service needs. Take advantage of this benefit every year!  -All adults over the age of 72 should receive the recommended pneumonia vaccines. Current USPSTF guidelines recommend a series of two vaccines for the best pneumonia protection.   -All adults should have a flu vaccine yearly and a tetanus vaccine every 10 years.   -All adults age 48 and older should receive the shingles vaccines (series of two vaccines).       -All adults age 38-68 who are overweight should have a diabetes screening test once every three years.   -All adults born between [birth year] and 1966 should be screened once for Hepatitis C.  -Other screening tests and preventive services for persons with diabetes include: an eye exam to screen for diabetic retinopathy, a kidney function test, a foot exam, and stricter control over your cholesterol.   -Cardiovascular screening for adults with routine risk involves an electrocardiogram (ECG) at intervals determined by your doctor.   -Colorectal cancer screenings should be done for adults age 54-65 with no increased risk factors for colorectal cancer. There are a number of acceptable methods of screening for this type of cancer. Each test has its own benefits and drawbacks. Discuss with your doctor what is most appropriate for you during your annual wellness visit. The different tests include: colonoscopy (considered the best screening method), a fecal occult blood test, a fecal DNA test, and sigmoidoscopy.    -A bone mass density test is recommended when a woman turns 65 to screen for osteoporosis. This test is only recommended one time, as a screening. Some providers will use this same test as a disease monitoring tool if you already have osteoporosis. -Breast cancer screenings are recommended every other year for women of normal risk, age 54-69.  -Cervical cancer screenings for women over age 72 are only recommended with certain risk factors.      Here is a list of your current Health Maintenance items (your personalized list of preventive services) with a due date:  Health Maintenance Due   Topic Date Due    Shingles Vaccine (1 of 2) 12/04/1994    Hemoglobin A1C    06/19/2020    Annual Well Visit  06/19/2020    Glaucoma Screening   08/01/2020

## 2020-07-16 NOTE — PROGRESS NOTES
This is the Subsequent Medicare Annual Wellness Exam, performed 12 months or more after the Initial AWV or the last Subsequent AWV    I have reviewed the patient's medical history in detail and updated the computerized patient record. 76 y/o pleasant female is here for follow up on blood pressure as well as medicare well ness. She is here after one year. Has been following up with Mitzy Serrano for carcinoma of colon. BP has been well controlled with amlodipine 10 mg and valsartan 160 mg. Denies any chest pain, soa, cough, abdominal pain.     Gout been stable with Allopurinol twice/week. No flare up. Has been following up with eye specialist for Glaucoma follow up. GERD well controled with Prilosec. Patient reports that she was hit by a golf ball in her left knee 2 weeks ago. It was swollen and bruised. Now its lot better. Denies any pain but feels warm little.      Lab Results   Component Value Date/Time    Hemoglobin A1c 6.1 (H) 06/19/2019 10:52 AM     Lab Results   Component Value Date/Time    Cholesterol, total 146 06/19/2019 10:52 AM    HDL Cholesterol 38 (L) 06/19/2019 10:52 AM    LDL, calculated 73 06/19/2019 10:52 AM    VLDL, calculated 35 06/19/2019 10:52 AM    Triglyceride 174 (H) 06/19/2019 10:52 AM    CHOL/HDL Ratio 5.2 (H) 10/13/2010 12:32 PM         Visit Vitals  /77 (BP 1 Location: Right arm, BP Patient Position: Sitting)   Pulse 77   Temp 97.2 °F (36.2 °C) (Temporal)   Resp 16   Ht 5' 2\" (1.575 m)   Wt 212 lb 12.8 oz (96.5 kg)   SpO2 93%   BMI 38.92 kg/m²         History     Patient Active Problem List   Diagnosis Code    Benign essential hypertension I10    Gout M10.9    Hypertriglyceridemia E78.1    H/O mammogram Z92.89    H/O colonoscopy Z98.890    Right bundle branch block I45.10    Low HDL (under 40) E78.6    Elevated glucose R73.09    Open angle with borderline findings, low risk H40.019    ACP (advance care planning) Z71.89    [facility]en[facility] C18.9    Severe obesity (BMI 35.0-39. 9) with comorbidity ([facility] E66.01     Past Medical History:   Diagnosis Date    [facility]     parial colocetomy in 8.2017    Contact dermatitis and other eczema, due to unspecified cause     rosacea; AK's    Gout     HTN (hypertension)     Hypercholesterolemia     Pre-diabetes       Past Surgical History:   Procedure Laterality Date    ABDOMEN SURGERY PROC UNLISTED      colectomy in 2017    COLONOSCOPY N/A 7/21/2017    COLONOSCOPY, EGD performed by Scherrie Najjar, MD at [address] HX COLONOSCOPY  2012 2012, 2017    HX ENDOSCOPY  2017    HX HEENT  1949    T & A    HX ORTHOPAEDIC  2000    bilateral hammertoe surgery    HX ORTHOPAEDIC  2002    r rotator cuff repair     Current Outpatient Medications   Medication Sig Dispense Refill    valsartan (DIOVAN) 160 mg tablet Take 1 Tab by mouth daily. Must need appointment. 90 Tab 0    amLODIPine (NORVASC) 10 mg tablet TAKE 1 TABLET BY MOUTH EVERY DAY 15 Tab 0    allopurinoL (ZYLOPRIM) 300 mg tablet Take 1 Tab by mouth daily. 90 Tab 1    omeprazole (PRILOSEC) 20 mg capsule Take 1 Cap by mouth daily.  90 Cap 1     Allergies   Allergen Reactions    Ace Inhibitors Cough    Penicillins Hives    Seafood [Shellfish Containing Products] Other (comments)     All seafood aggravates gout    Shellfish Containing Products Other (comments)     gout    Terazosin Unknown (comments)     Incont       Family History   Problem Relation Age of Onset    Cancer Mother         lung, breast    Heart Failure Father     Hypertension Father     Coronary Artery Disease Father         CABG at [de-identified]    Arthritis-osteo Sister     Hypertension Sister     Obesity Daughter     No Known Problems Son      Social History     Tobacco Use    Smoking status: Never Smoker    Smokeless tobacco: Never Used   Substance Use Topics    Alcohol use: No       Depression Risk Factor Screening:     3 most recent PHQ Screens 7/16/2020   Little interest or pleasure in doing things Not at all   Feeling down, depressed, irritable, or hopeless Not at all   Total Score PHQ 2 0       Alcohol Risk Factor Screening:   Do you average 1 drink per night or more than 7 drinks a week:  No    On any one occasion in the past three months have you have had more than 3 drinks containing alcohol:  No      Functional Ability and Level of Safety:   Hearing: Hearing is good. Activities of Daily Living: The home contains: handrails, grab bars and rugs  Patient does total self care     Ambulation: with no difficulty     Fall Risk:  Fall Risk Assessment, last 12 mths 7/16/2020   Able to walk? Yes   Fall in past 12 months? No     Abuse Screen:  Patient is not abused     General: stated age, well developed, well nourished and in NAD  Neck: supple, symmetrical, trachea midline, no adenopathy and thyroid: not enlarged, symmetric, no tenderness/mass/nodules  Lungs:  clear to auscultation w/o rales, rhonchi, wheezes w/normal effort and no use of accessory muscles of respiration   Heart: regular rate and rhythm, S1, S2 normal, no murmur, click, rub or gallop  Abdomen: soft, nontender, no masses, BS normal  Ext:  No edema noted. Lymph: no cervical adenopathy appreciated  Skin:  Normal. and no rash or abnormalities   Psych: alert and oriented to person, place, time and situation and Speech: appropriate quality, quantity and organization of sentences  MSK: arthritic change noted in both hand joints. Slight redness noted over anterior left knee otherwise  ROM is completely normal, non tender. She will continue to monitor and let me know if anything worsen.             Cognitive Screening   Has your family/caregiver stated any concerns about your memory: no     Cognitive Screening: Normal - Verbal Fluency Test    Patient Care Team   Patient Care Team:  Stew Grimm MD as PCP - General (Family Practice)  Stew Grimm MD as PCP - [facility] Provider  Yessi Warner DO (Obstetrics & Gynecology)  Compa Urbina MD (Hematology and Oncology)    Assessment/Plan   Education and counseling provided:  Are appropriate based on today's review and evaluation  End-of-Life planning (with patient's consent)- she is DNR. Colorectal cancer screening tests- following up with cancer institute. Bone mass measurement (DEXA)  Screening for glaucoma- getting eye check up regularly. Diagnoses and all orders for this visit:    1. Medicare annual wellness visit, subsequent    2. Benign essential hypertension  -   BP well controlled. METABOLIC PANEL, COMPREHENSIVE  -     valsartan (DIOVAN) 160 mg tablet; Take 1 Tab by mouth daily. Must need appointment. -     amLODIPine (NORVASC) 10 mg tablet; TAKE 1 TABLET BY MOUTH EVERY DAY    3. Elevated glycosylated hemoglobin  -     HEMOGLOBIN A1C WITH EAG    4. Hypertriglyceridemia  -     LIPID PANEL  -     METABOLIC PANEL, COMPREHENSIVE    5. Screening for depression  -     DEPRESSION SCREEN ANNUAL    6. Advance directive indicates patient wish for do-not-intubate resuscitation status    7. Gastroesophageal reflux disease without esophagitis  -     omeprazole (PRILOSEC) 20 mg capsule; Take 1 Cap by mouth daily. 8. Severe obesity (BMI 35.0-39. 9) with comorbidity ([address]       Encouraged to continue work on diet and exercise. 9. Adenocarcinoma of colon, [facility] C ([facility]       Closely following up with oncologist.     Libby Stanley to obtain shingle vaccine from local pharmacy.     Health Maintenance Due   Topic Date Due    Shingrix Vaccine Age 49> (1 of 2) 12/04/1994    A1C test (Diabetic or Prediabetic)  06/19/2020    Medicare Yearly Exam  06/19/2020    GLAUCOMA SCREENING Q2Y  08/01/2020

## 2020-07-17 LAB
ALBUMIN SERPL-MCNC: 4.3 G/DL (ref 3.7–4.7)
ALBUMIN/GLOB SERPL: 1.7 {RATIO} (ref 1.2–2.2)
ALP SERPL-CCNC: 85 IU/L (ref 39–117)
ALT SERPL-CCNC: 15 IU/L (ref 0–32)
AST SERPL-CCNC: 25 IU/L (ref 0–40)
BILIRUB SERPL-MCNC: 0.6 MG/DL (ref 0–1.2)
BUN SERPL-MCNC: 13 MG/DL (ref 8–27)
BUN/CREAT SERPL: 16 (ref 12–28)
CALCIUM SERPL-MCNC: 9.3 MG/DL (ref 8.7–10.3)
CHLORIDE SERPL-SCNC: 102 MMOL/L (ref 96–106)
CHOLEST SERPL-MCNC: 181 MG/DL (ref 100–199)
CO2 SERPL-SCNC: 24 MMOL/L (ref 20–29)
CREAT SERPL-MCNC: 0.81 MG/DL (ref 0.57–1)
EST. AVERAGE GLUCOSE BLD GHB EST-MCNC: 111 MG/DL
GLOBULIN SER CALC-MCNC: 2.5 G/DL (ref 1.5–4.5)
GLUCOSE SERPL-MCNC: 110 MG/DL (ref 65–99)
HBA1C MFR BLD: 5.5 % (ref 4.8–5.6)
HDLC SERPL-MCNC: 36 MG/DL
LDLC SERPL CALC-MCNC: 70 MG/DL (ref 0–99)
POTASSIUM SERPL-SCNC: 4.1 MMOL/L (ref 3.5–5.2)
PROT SERPL-MCNC: 6.8 G/DL (ref 6–8.5)
SODIUM SERPL-SCNC: 139 MMOL/L (ref 134–144)
TRIGL SERPL-MCNC: 375 MG/DL (ref 0–149)
VLDLC SERPL CALC-MCNC: 75 MG/DL (ref 5–40)

## 2020-07-21 NOTE — PROGRESS NOTES
Please call patient and notify. 1. Triglyceride level went up significantly from last time. Please work on diet and exercise. Need a follow up in 3 months. 2. Kidney and liver function is normal.    3. Average blood sugar level is back to normal. Atiya Quiñonez! Thanks.     Dr. Sharee Lundborg

## 2020-08-29 DIAGNOSIS — I10 BENIGN ESSENTIAL HYPERTENSION: ICD-10-CM

## 2020-08-29 DIAGNOSIS — K21.9 GASTROESOPHAGEAL REFLUX DISEASE WITHOUT ESOPHAGITIS: ICD-10-CM

## 2020-08-30 RX ORDER — OMEPRAZOLE 20 MG/1
20 CAPSULE, DELAYED RELEASE ORAL DAILY
Qty: 90 CAP | Refills: 1 | Status: SHIPPED | OUTPATIENT
Start: 2020-08-30 | End: 2020-12-11 | Stop reason: SDUPTHER

## 2020-08-30 RX ORDER — VALSARTAN 160 MG/1
160 TABLET ORAL DAILY
Qty: 90 TAB | Refills: 1 | Status: SHIPPED | OUTPATIENT
Start: 2020-08-30 | End: 2020-12-11 | Stop reason: SDUPTHER

## 2020-12-11 DIAGNOSIS — I10 BENIGN ESSENTIAL HYPERTENSION: ICD-10-CM

## 2020-12-11 DIAGNOSIS — K21.9 GASTROESOPHAGEAL REFLUX DISEASE WITHOUT ESOPHAGITIS: ICD-10-CM

## 2020-12-11 RX ORDER — VALSARTAN 160 MG/1
160 TABLET ORAL DAILY
Qty: 90 TAB | Refills: 1 | Status: SHIPPED | OUTPATIENT
Start: 2020-12-11 | End: 2021-03-07 | Stop reason: SDUPTHER

## 2020-12-11 RX ORDER — AMLODIPINE BESYLATE 10 MG/1
TABLET ORAL
Qty: 90 TAB | Refills: 1 | Status: SHIPPED | OUTPATIENT
Start: 2020-12-11 | End: 2021-08-24 | Stop reason: SDUPTHER

## 2020-12-11 RX ORDER — OMEPRAZOLE 20 MG/1
20 CAPSULE, DELAYED RELEASE ORAL DAILY
Qty: 90 CAP | Refills: 1 | Status: SHIPPED | OUTPATIENT
Start: 2020-12-11 | End: 2021-03-07 | Stop reason: SDUPTHER

## 2020-12-11 NOTE — TELEPHONE ENCOUNTER
Last office visit 7/16/2020  Last med refill  Valsartan 8/30/2020  Omeprazole 8/30/2020  Amlodipine 7/16/2020

## 2021-01-22 ENCOUNTER — IMMUNIZATION (OUTPATIENT)
Dept: INTERNAL MEDICINE CLINIC | Age: 76
End: 2021-01-22
Payer: MEDICARE

## 2021-01-22 DIAGNOSIS — Z23 ENCOUNTER FOR IMMUNIZATION: Primary | ICD-10-CM

## 2021-01-22 PROCEDURE — 0011A PR IMM ADMN SARSCOV2 100 MCG/0.5 ML 1ST DOSE: CPT | Performed by: FAMILY MEDICINE

## 2021-01-22 PROCEDURE — 91301 COVID-19, MRNA, LNP-S, PF, 100MCG/0.5ML DOSE(MODERNA): CPT | Performed by: FAMILY MEDICINE

## 2021-02-19 ENCOUNTER — IMMUNIZATION (OUTPATIENT)
Dept: INTERNAL MEDICINE CLINIC | Age: 76
End: 2021-02-19
Payer: MEDICARE

## 2021-02-19 DIAGNOSIS — Z23 ENCOUNTER FOR IMMUNIZATION: Primary | ICD-10-CM

## 2021-02-19 PROCEDURE — 91301 COVID-19, MRNA, LNP-S, PF, 100MCG/0.5ML DOSE(MODERNA): CPT | Performed by: FAMILY MEDICINE

## 2021-02-19 PROCEDURE — 0012A COVID-19, MRNA, LNP-S, PF, 100MCG/0.5ML DOSE(MODERNA): CPT | Performed by: FAMILY MEDICINE

## 2021-03-10 DIAGNOSIS — M1A.0790 CHRONIC GOUT OF FOOT, UNSPECIFIED CAUSE, UNSPECIFIED LATERALITY: ICD-10-CM

## 2021-03-10 RX ORDER — ALLOPURINOL 300 MG/1
300 TABLET ORAL DAILY
Qty: 90 TAB | Refills: 0 | Status: SHIPPED | OUTPATIENT
Start: 2021-03-10 | End: 2022-07-12 | Stop reason: ALTCHOICE

## 2021-08-24 ENCOUNTER — OFFICE VISIT (OUTPATIENT)
Dept: PRIMARY CARE CLINIC | Age: 76
End: 2021-08-24
Payer: MEDICARE

## 2021-08-24 VITALS
TEMPERATURE: 98 F | HEIGHT: 62 IN | SYSTOLIC BLOOD PRESSURE: 134 MMHG | WEIGHT: 198.4 LBS | DIASTOLIC BLOOD PRESSURE: 81 MMHG | HEART RATE: 85 BPM | OXYGEN SATURATION: 97 % | BODY MASS INDEX: 36.51 KG/M2

## 2021-08-24 DIAGNOSIS — Z11.59 ENCOUNTER FOR HEPATITIS C SCREENING TEST FOR LOW RISK PATIENT: ICD-10-CM

## 2021-08-24 DIAGNOSIS — E78.1 HYPERTRIGLYCERIDEMIA: ICD-10-CM

## 2021-08-24 DIAGNOSIS — I10 BENIGN ESSENTIAL HYPERTENSION: ICD-10-CM

## 2021-08-24 DIAGNOSIS — Z00.00 MEDICARE ANNUAL WELLNESS VISIT, SUBSEQUENT: Primary | ICD-10-CM

## 2021-08-24 DIAGNOSIS — C18.9 ADENOCARCINOMA OF COLON, DUKE'S C (HCC): ICD-10-CM

## 2021-08-24 DIAGNOSIS — R73.09 ELEVATED GLYCOSYLATED HEMOGLOBIN: ICD-10-CM

## 2021-08-24 DIAGNOSIS — Z13.31 SCREENING FOR DEPRESSION: ICD-10-CM

## 2021-08-24 DIAGNOSIS — Z71.89 ADVANCED DIRECTIVES, COUNSELING/DISCUSSION: ICD-10-CM

## 2021-08-24 DIAGNOSIS — B37.89: ICD-10-CM

## 2021-08-24 PROCEDURE — 1090F PRES/ABSN URINE INCON ASSESS: CPT | Performed by: FAMILY MEDICINE

## 2021-08-24 PROCEDURE — 1101F PT FALLS ASSESS-DOCD LE1/YR: CPT | Performed by: FAMILY MEDICINE

## 2021-08-24 PROCEDURE — G8399 PT W/DXA RESULTS DOCUMENT: HCPCS | Performed by: FAMILY MEDICINE

## 2021-08-24 PROCEDURE — G8417 CALC BMI ABV UP PARAM F/U: HCPCS | Performed by: FAMILY MEDICINE

## 2021-08-24 PROCEDURE — G8754 DIAS BP LESS 90: HCPCS | Performed by: FAMILY MEDICINE

## 2021-08-24 PROCEDURE — G8427 DOCREV CUR MEDS BY ELIG CLIN: HCPCS | Performed by: FAMILY MEDICINE

## 2021-08-24 PROCEDURE — G0439 PPPS, SUBSEQ VISIT: HCPCS | Performed by: FAMILY MEDICINE

## 2021-08-24 PROCEDURE — G8510 SCR DEP NEG, NO PLAN REQD: HCPCS | Performed by: FAMILY MEDICINE

## 2021-08-24 PROCEDURE — 99214 OFFICE O/P EST MOD 30 MIN: CPT | Performed by: FAMILY MEDICINE

## 2021-08-24 PROCEDURE — G8752 SYS BP LESS 140: HCPCS | Performed by: FAMILY MEDICINE

## 2021-08-24 PROCEDURE — G8536 NO DOC ELDER MAL SCRN: HCPCS | Performed by: FAMILY MEDICINE

## 2021-08-24 RX ORDER — AMLODIPINE BESYLATE 10 MG/1
TABLET ORAL
Qty: 90 TABLET | Refills: 1 | Status: SHIPPED | OUTPATIENT
Start: 2021-08-24 | End: 2022-07-25 | Stop reason: SDUPTHER

## 2021-08-24 RX ORDER — VALSARTAN 160 MG/1
160 TABLET ORAL DAILY
Qty: 90 TABLET | Refills: 1 | Status: SHIPPED | OUTPATIENT
Start: 2021-08-24 | End: 2022-05-02 | Stop reason: SDUPTHER

## 2021-08-24 RX ORDER — ZOSTER VACCINE RECOMBINANT, ADJUVANTED 50 MCG/0.5
KIT INTRAMUSCULAR
Qty: 0.5 ML | Refills: 1 | Status: SHIPPED | OUTPATIENT
Start: 2021-08-24 | End: 2022-07-12 | Stop reason: ALTCHOICE

## 2021-08-24 RX ORDER — NYSTATIN 100000 U/G
CREAM TOPICAL 2 TIMES DAILY
Qty: 15 G | Refills: 0 | Status: SHIPPED | OUTPATIENT
Start: 2021-08-24 | End: 2021-09-01 | Stop reason: SDUPTHER

## 2021-08-24 NOTE — PATIENT INSTRUCTIONS

## 2021-08-24 NOTE — PROGRESS NOTES
[facility] Medicare Wellness Visit      Annia Hood is a 67 y.o. female and presents for Annual Medicare Wellness Visit. Vitals:    08/24/21 1054   BP: 134/81   Pulse: 85   Temp: 98 °F (36.7 °C)   TempSrc: Temporal   SpO2: 97%   Weight: 198 lb 6.4 oz (90 kg)   Height: 5' 2\" (1.575 m)   PainSc:   0 - No pain        Depression Screen:   3 most recent PHQ Screens 8/24/2021   Little interest or pleasure in doing things Not at all   Feeling down, depressed, irritable, or hopeless Not at all   Total Score PHQ 2 0       Fall Risk Assessment:    Fall Risk Assessment, last 12 mths 8/24/2021   Able to walk? Yes   Fall in past 12 months? 0   Do you feel unsteady? 0   Are you worried about falling 0       Abuse Screen:   Abuse Screening Questionnaire 6/19/2019   Do you ever feel afraid of your partner? N   Are you in a relationship with someone who physically or mentally threatens you? N   Is it safe for you to go home? Y       Health Maintenance Due   Topic    Hepatitis C Screening     Shingrix Vaccine Age 50> (1 of 2)    Medicare Yearly Exam        1. Have you been to the ER, urgent care clinic since your last visit? Hospitalized since your last visit? No    2. Have you seen or consulted any other health care providers outside of the [address] [place] [address] since your last visit? Include any pap smears or colon screening.  No

## 2021-08-24 NOTE — PROGRESS NOTES
This is the Subsequent Medicare Annual Wellness Exam, performed 12 months or more after the Initial AWV or the last Subsequent AWV    I have reviewed the patient's medical history in detail and updated the computerized patient record. 66 y/o pleasant female is here for follow up on blood pressure as well as medicare well ness. She is here after one year. Has been following up with Eduar Griffith for carcinoma of colon. She is scheduled for CT scan today. She is here with a c/o itchy, red area under her both breast as well as lower abdomen. Reports that these symptoms comes and goes but for the past two weeks its been worse. Has been using OTC gold bond powder.      BP has been well controlled with amlodipine 10 mg and valsartan 160 mg. Denies any chest pain, soa, cough, abdominal pain.     Gout been stable with Allopurinol 2-3 times/week. No flare up.     Has been following up with eye specialist for Glaucoma follow up.      GERD well controlled with as needed Prilosec. Lab Results   Component Value Date/Time    Cholesterol, total 181 07/16/2020 11:51 AM    HDL Cholesterol 36 (L) 07/16/2020 11:51 AM    LDL, calculated 70 07/16/2020 11:51 AM    VLDL, calculated 75 (H) 07/16/2020 11:51 AM    Triglyceride 375 (H) 07/16/2020 11:51 AM    CHOL/HDL Ratio 5.2 (H) 10/13/2010 12:32 PM       Assessment/Plan   Education and counseling provided:  Are appropriate based on today's review and evaluation  End-of-Life planning (with patient's consent)  Screening Mammography  Colorectal cancer screening tests  Screening for glaucoma    1. Medicare annual wellness visit, subsequent  2. 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 Follow up if anything worsen.   3. Benign essential hypertension  -     METABOLIC PANEL, COMPREHENSIVE; Future  -    Well controlled. valsartan (DIOVAN) 160 mg tablet; Take 1 Tablet by mouth daily. , Normal, Disp-90 Tablet, R-1  -     amLODIPine (NORVASC) 10 mg tablet; TAKE 1 TABLET BY MOUTH EVERY DAY, Normal, Disp-90 Tablet, R-1  4. Hypertriglyceridemia  -     METABOLIC PANEL, COMPREHENSIVE; Future  -     LIPID PANEL; Future  5. Elevated glycosylated hemoglobin  -     HEMOGLOBIN A1C WITH EAG; Future  6. Adenocarcinoma of colon, [facility] ([facility] [address]  7. Advanced directives, counseling/discussion  -     DO NOT RESUSCITATE  8. Screening for depression-  Denies any depression. 9. Encounter for hepatitis C screening test for low risk patient  -     HEPATITIS C AB; Future     Gout: well controlled. Depression Risk Factor Screening     3 most recent PHQ Screens 8/24/2021   Little interest or pleasure in doing things Not at all   Feeling down, depressed, irritable, or hopeless Not at all   Total Score PHQ 2 0       Alcohol Risk Screen    Do you average more than 1 drink per night or more than 7 drinks a week:  No    On any one occasion in the past three months have you have had more than 3 drinks containing alcohol:  No        Functional Ability and Level of Safety    Hearing: Hearing is good. Activities of Daily Living: The home contains: grab bars and rugs  Patient does total self care      Ambulation: with no difficulty     Fall Risk:  Fall Risk Assessment, last 12 mths 8/24/2021   Able to walk? Yes   Fall in past 12 months? 0   Do you feel unsteady?  0   Are you worried about falling 0      Abuse Screen:  Patient is not abused     General: stated age, well developed, well nourished and in NAD  Neck: supple, symmetrical, trachea midline, no adenopathy and thyroid: not enlarged, symmetric, no tenderness/mass/nodules  Lungs:  clear to auscultation w/o rales, rhonchi, wheezes w/normal effort and no use of accessory muscles of respiration   Heart: regular rate and rhythm, S1, S2 normal, no murmur, click, rub or gallop  Abdomen: soft, nontender, no masses, BS normal  Ext:  No edema noted. Lymph: no cervical adenopathy appreciated  Skin:  Normal. and no rash or abnormalities   Psych: alert and oriented to person, place, time and situation and Speech: appropriate quality, quantity and organization of sentences. Skin: erythematous, dry area noted under her both breast as well as over the lower abdominal crease. Cognitive Screening    Has your family/caregiver stated any concerns about your memory: no     Cognitive Screening: Normal - Verbal Fluency Test    Health Maintenance Due     Health Maintenance Due   Topic Date Due    Hepatitis C Screening  Never done    Shingrix Vaccine Age 50> (1 of 2) Never done       Patient Care Team   Patient Care Team:  Milena Patel MD as PCP - General (Family Medicine)  Milena Patel MD as PCP - [address] [place] [address] Provider  Deneen Tavera DO (Obstetrics & Gynecology)  Kierra Barros MD (Hematology and Oncology)    History     Patient Active Problem List   Diagnosis Code    Benign essential hypertension I10    Gout M10.9    Hypertriglyceridemia E78.1    H/O mammogram Z92.89    H/O colonoscopy Z98.890    Right bundle branch block I45.10    Low HDL (under 40) E78.6    Elevated glucose R73.09    Open angle with borderline findings, low risk H40.019    ACP (advance care planning) Z71.89    Adenocarcinoma of colon, [facility] ([address] C18.9    Severe obesity (BMI 35.0-39. 9) with comorbidity ([address] E66.01     Past Medical History:   Diagnosis Date    Cancer ([address]     parial colocetomy in 8.2017    Contact dermatitis and other eczema, due to unspecified cause     rosacea; AK's    Gout     HTN (hypertension)     Hypercholesterolemia     Pre-diabetes       Past Surgical History:   Procedure Laterality Date    COLONOSCOPY N/A 7/21/2017    COLONOSCOPY, EGD performed by Vickie De Oliveira MD at [address] HX COLONOSCOPY  2012 2012, 2017    HX ENDOSCOPY  2017  HX HEENT  1949    T & A    HX ORTHOPAEDIC  2000    bilateral hammertoe surgery    HX ORTHOPAEDIC  2002    r rotator cuff repair    NE ABDOMEN SURGERY PROC UNLISTED      colectomy in 2017     Current Outpatient Medications   Medication Sig Dispense Refill    varicella-zoster recombinant, PF, (Shingrix, PF,) 50 mcg/0.5 mL susr injection 0.5mL by IntraMUSCular route once now and then repeat in 2-6 months 0.5 mL 1    omeprazole (PRILOSEC) 20 mg capsule Take 1 Cap by mouth daily. 90 Cap 0    allopurinoL (ZYLOPRIM) 300 mg tablet Take 1 Tab by mouth daily. 90 Tab 0    valsartan (DIOVAN) 160 mg tablet Take 1 Tab by mouth daily. Must need appointment. 90 Tab 0    amLODIPine (NORVASC) 10 mg tablet TAKE 1 TABLET BY MOUTH EVERY DAY 90 Tab 1     Allergies   Allergen Reactions    Ace Inhibitors Cough    Penicillins Hives    Seafood [Shellfish Containing Products] Other (comments)     All seafood aggravates gout    Shellfish Containing Products Other (comments)     gout    Terazosin Unknown (comments)     Incont       Family History   Problem Relation Age of Onset    Cancer Mother         lung, breast    Heart Failure Father     Hypertension Father     Coronary Artery Disease Father         CABG at [de-identified]    Arthritis-osteo Sister     Hypertension Sister     Obesity Daughter     No Known Problems Son      Social History     Tobacco Use    Smoking status: Never Smoker    Smokeless tobacco: Never Used   Substance Use Topics    Alcohol use: No         Follow-up and Dispositions    · Return in about 6 months (around 2/24/2022), or if symptoms worsen or fail to improve, for Bring BP log book. Ruthie Krause MD

## 2021-08-25 LAB
ALBUMIN SERPL-MCNC: 4.7 G/DL (ref 3.7–4.7)
ALBUMIN/GLOB SERPL: 1.8 {RATIO} (ref 1.2–2.2)
ALP SERPL-CCNC: 96 IU/L (ref 48–121)
ALT SERPL-CCNC: 18 IU/L (ref 0–32)
AST SERPL-CCNC: 27 IU/L (ref 0–40)
BILIRUB SERPL-MCNC: 0.8 MG/DL (ref 0–1.2)
BUN SERPL-MCNC: 14 MG/DL (ref 8–27)
BUN/CREAT SERPL: 15 (ref 12–28)
CALCIUM SERPL-MCNC: 9.6 MG/DL (ref 8.7–10.3)
CHLORIDE SERPL-SCNC: 102 MMOL/L (ref 96–106)
CHOLEST SERPL-MCNC: 159 MG/DL (ref 100–199)
CO2 SERPL-SCNC: 26 MMOL/L (ref 20–29)
CREAT SERPL-MCNC: 0.95 MG/DL (ref 0.57–1)
EST. AVERAGE GLUCOSE BLD GHB EST-MCNC: 120 MG/DL
GLOBULIN SER CALC-MCNC: 2.6 G/DL (ref 1.5–4.5)
GLUCOSE SERPL-MCNC: 133 MG/DL (ref 65–99)
HBA1C MFR BLD: 5.8 % (ref 4.8–5.6)
HCV AB S/CO SERPL IA: 0.2 S/CO RATIO (ref 0–0.9)
HDLC SERPL-MCNC: 37 MG/DL
LDLC SERPL CALC-MCNC: 77 MG/DL (ref 0–99)
POTASSIUM SERPL-SCNC: 4.5 MMOL/L (ref 3.5–5.2)
PROT SERPL-MCNC: 7.3 G/DL (ref 6–8.5)
SODIUM SERPL-SCNC: 142 MMOL/L (ref 134–144)
TRIGL SERPL-MCNC: 279 MG/DL (ref 0–149)
VLDLC SERPL CALC-MCNC: 45 MG/DL (ref 5–40)

## 2021-08-27 NOTE — PROGRESS NOTES
Please mail letter:    1. Kidney and liver function is normal.  2. Triglyceride level is high otherwise cholesterol level is fine. Continue current regiment. 3. Hep c is negative. 4. Blood sugar level is in prediabetic range but stable. Keep up the good work! Follow up in 6 months.

## 2021-09-01 DIAGNOSIS — B37.89: ICD-10-CM

## 2021-09-01 RX ORDER — NYSTATIN 100000 U/G
CREAM TOPICAL 2 TIMES DAILY
Qty: 15 G | Refills: 0 | Status: SHIPPED | OUTPATIENT
Start: 2021-09-01

## 2022-01-03 DIAGNOSIS — K21.9 GASTROESOPHAGEAL REFLUX DISEASE WITHOUT ESOPHAGITIS: ICD-10-CM

## 2022-01-03 RX ORDER — OMEPRAZOLE 20 MG/1
CAPSULE, DELAYED RELEASE ORAL
Qty: 90 CAPSULE | Refills: 1 | Status: SHIPPED | OUTPATIENT
Start: 2022-01-03

## 2022-03-19 PROBLEM — C18.9: Status: ACTIVE | Noted: 2017-08-22

## 2022-03-20 PROBLEM — E66.01 SEVERE OBESITY (BMI 35.0-39.9) WITH COMORBIDITY (HCC): Status: ACTIVE | Noted: 2018-03-21

## 2022-05-02 DIAGNOSIS — I10 BENIGN ESSENTIAL HYPERTENSION: ICD-10-CM

## 2022-05-04 RX ORDER — VALSARTAN 160 MG/1
160 TABLET ORAL DAILY
Qty: 90 TABLET | Refills: 0 | Status: SHIPPED | OUTPATIENT
Start: 2022-05-04 | End: 2022-08-27

## 2022-07-12 ENCOUNTER — OFFICE VISIT (OUTPATIENT)
Dept: PRIMARY CARE CLINIC | Age: 77
End: 2022-07-12
Payer: MEDICARE

## 2022-07-12 VITALS
SYSTOLIC BLOOD PRESSURE: 123 MMHG | WEIGHT: 192.2 LBS | HEIGHT: 62 IN | HEART RATE: 80 BPM | RESPIRATION RATE: 20 BRPM | OXYGEN SATURATION: 94 % | BODY MASS INDEX: 35.37 KG/M2 | TEMPERATURE: 97 F | DIASTOLIC BLOOD PRESSURE: 78 MMHG

## 2022-07-12 DIAGNOSIS — E66.9 OBESITY (BMI 30-39.9): ICD-10-CM

## 2022-07-12 DIAGNOSIS — Z85.038 HISTORY OF COLON CANCER: ICD-10-CM

## 2022-07-12 DIAGNOSIS — I10 PRIMARY HYPERTENSION: Primary | ICD-10-CM

## 2022-07-12 DIAGNOSIS — E78.1 HYPERTRIGLYCERIDEMIA: ICD-10-CM

## 2022-07-12 DIAGNOSIS — M1A.0790 CHRONIC GOUT OF FOOT, UNSPECIFIED CAUSE, UNSPECIFIED LATERALITY: ICD-10-CM

## 2022-07-12 PROBLEM — C18.9: Status: RESOLVED | Noted: 2017-08-22 | Resolved: 2022-07-12

## 2022-07-12 LAB
ALBUMIN SERPL-MCNC: 4.1 G/DL (ref 3.5–5)
ALBUMIN/GLOB SERPL: 1.2 {RATIO} (ref 1.1–2.2)
ALP SERPL-CCNC: 82 U/L (ref 45–117)
ALT SERPL-CCNC: 18 U/L (ref 12–78)
ANION GAP SERPL CALC-SCNC: 7 MMOL/L (ref 5–15)
AST SERPL-CCNC: 20 U/L (ref 15–37)
BILIRUB SERPL-MCNC: 0.8 MG/DL (ref 0.2–1)
BUN SERPL-MCNC: 18 MG/DL (ref 6–20)
BUN/CREAT SERPL: 19 (ref 12–20)
CALCIUM SERPL-MCNC: 9.7 MG/DL (ref 8.5–10.1)
CEA SERPL-MCNC: 1.4 NG/ML
CHLORIDE SERPL-SCNC: 106 MMOL/L (ref 97–108)
CHOLEST SERPL-MCNC: 183 MG/DL
CO2 SERPL-SCNC: 28 MMOL/L (ref 21–32)
CREAT SERPL-MCNC: 0.94 MG/DL (ref 0.55–1.02)
ERYTHROCYTE [DISTWIDTH] IN BLOOD BY AUTOMATED COUNT: 12.9 % (ref 11.5–14.5)
GLOBULIN SER CALC-MCNC: 3.3 G/DL (ref 2–4)
GLUCOSE SERPL-MCNC: 104 MG/DL (ref 65–100)
HCT VFR BLD AUTO: 47.5 % (ref 35–47)
HDLC SERPL-MCNC: 39 MG/DL
HDLC SERPL: 4.7 {RATIO} (ref 0–5)
HGB BLD-MCNC: 16.5 G/DL (ref 11.5–16)
LDLC SERPL CALC-MCNC: 87 MG/DL (ref 0–100)
MCH RBC QN AUTO: 32.4 PG (ref 26–34)
MCHC RBC AUTO-ENTMCNC: 34.7 G/DL (ref 30–36.5)
MCV RBC AUTO: 93.1 FL (ref 80–99)
NRBC # BLD: 0 K/UL (ref 0–0.01)
NRBC BLD-RTO: 0 PER 100 WBC
PLATELET # BLD AUTO: 151 K/UL (ref 150–400)
PMV BLD AUTO: 9.7 FL (ref 8.9–12.9)
POTASSIUM SERPL-SCNC: 4.2 MMOL/L (ref 3.5–5.1)
PROT SERPL-MCNC: 7.4 G/DL (ref 6.4–8.2)
RBC # BLD AUTO: 5.1 M/UL (ref 3.8–5.2)
SODIUM SERPL-SCNC: 141 MMOL/L (ref 136–145)
TRIGL SERPL-MCNC: 285 MG/DL (ref ?–150)
URATE SERPL-MCNC: 8.1 MG/DL (ref 2.6–6)
VLDLC SERPL CALC-MCNC: 57 MG/DL
WBC # BLD AUTO: 4.4 K/UL (ref 3.6–11)

## 2022-07-12 PROCEDURE — 99214 OFFICE O/P EST MOD 30 MIN: CPT | Performed by: INTERNAL MEDICINE

## 2022-07-12 PROCEDURE — 1090F PRES/ABSN URINE INCON ASSESS: CPT | Performed by: INTERNAL MEDICINE

## 2022-07-12 PROCEDURE — G8417 CALC BMI ABV UP PARAM F/U: HCPCS | Performed by: INTERNAL MEDICINE

## 2022-07-12 PROCEDURE — G8427 DOCREV CUR MEDS BY ELIG CLIN: HCPCS | Performed by: INTERNAL MEDICINE

## 2022-07-12 PROCEDURE — G8510 SCR DEP NEG, NO PLAN REQD: HCPCS | Performed by: INTERNAL MEDICINE

## 2022-07-12 PROCEDURE — G8536 NO DOC ELDER MAL SCRN: HCPCS | Performed by: INTERNAL MEDICINE

## 2022-07-12 PROCEDURE — 1101F PT FALLS ASSESS-DOCD LE1/YR: CPT | Performed by: INTERNAL MEDICINE

## 2022-07-12 PROCEDURE — G8754 DIAS BP LESS 90: HCPCS | Performed by: INTERNAL MEDICINE

## 2022-07-12 PROCEDURE — 1123F ACP DISCUSS/DSCN MKR DOCD: CPT | Performed by: INTERNAL MEDICINE

## 2022-07-12 PROCEDURE — G8399 PT W/DXA RESULTS DOCUMENT: HCPCS | Performed by: INTERNAL MEDICINE

## 2022-07-12 PROCEDURE — G8752 SYS BP LESS 140: HCPCS | Performed by: INTERNAL MEDICINE

## 2022-07-12 NOTE — PROGRESS NOTES
Joselo Rivas (: 1945) is a 67 y.o. female, new patient, here for evaluation of the following chief complaint(s):  Establish Care (Check up.) and Labs (Fasting)     Written by Britany Hudson, as dictated by Dr. Romario Medina MD.      ASSESSMENT/PLAN:  Below is the assessment and plan developed based on review of pertinent history, physical exam, labs, studies, and medications. 1. Primary hypertension  BP is well controlled on valsartan 160 mg daily and amlodipine 10 mg daily. Continue current medication(s). Labs ordered for medication management.   -     METABOLIC PANEL, COMPREHENSIVE; Future  -     CBC W/O DIFF; Future    2. Hypertriglyceridemia  Recheck lipid panel. Continue with fish oil tabs. -     LIPID PANEL; Future    3. History of colon cancer  Followed by GI. She gets colonoscopies every 3 years. Will check CEA. -     CEA; Future    4. Chronic gout of foot, unspecified cause, unspecified laterality  Stable at this time. She denies any recent gout attacks and no longer taking allopurinol. Will check uric acid level. 5. Obesity (BMI 30-39. 9)  Explained that she should be walking 5,000 steps daily to maintain conditioning and weight and increase to at least 10,000 steps to work on losing weight.    -     URIC ACID; Future    SUBJECTIVE/OBJECTIVE:  HPI   The patient presents today to establish care with me. She was previously followed by Dr. Pilar Leo. She is fasting today for blood work. Her BP today is good at 123/78. She is on valsartan 160 mg daily and amlodipine 10 mg daily. She takes fish oil tabs for hypertriglyceridemia. Her GERD has been well controlled. She has omeprazole, but does not take this daily. She was previously on allopurinol for gout, but this was stopped by Dr. Pilar Leo since she was not having gout attacks anymore. She has not had any recent gout attacks. She knows the food that will trigger her gout and tries to avoid them.      She has a hx of colon cancer that was caught early on. She was anemic which is how her colon cancer was detected. She is 5 years out from her dx and will no longer follow with Oncology. She has lost weight from 198 lb on 08/24/21 to 192 lb today. She has been trying to watch her diet and plays golf 2x per week. Patient Active Problem List   Diagnosis Code    Benign essential hypertension I10    Gout M10.9    Hypertriglyceridemia E78.1    H/O mammogram Z92.89    H/O colonoscopy Z98.890    Right bundle branch block I45.10    Low HDL (under 40) E78.6    Open angle with borderline findings, low risk H40.019    ACP (advance care planning) Z71.89    Severe obesity (BMI 35.0-39. 9) with comorbidity ([facility] E66.01    History of colon cancer Z85.038        Current Outpatient Medications on File Prior to Visit   Medication Sig Dispense Refill    valsartan (DIOVAN) 160 mg tablet Take 1 Tablet by mouth daily. 90 Tablet 0    omeprazole (PRILOSEC) 20 mg capsule TAKE 1 CAPSULE BY MOUTH EVERY DAY 90 Capsule 1    amLODIPine (NORVASC) 10 mg tablet TAKE 1 TABLET BY MOUTH EVERY DAY 90 Tablet 1    nystatin (MYCOSTATIN) topical cream Apply  to affected area two (2) times a day. (Patient not taking: Reported on 7/12/2022) 15 g 0    [DISCONTINUED] varicella-zoster recombinant, PF, (Shingrix, PF,) 50 mcg/0.5 mL susr injection 0.5mL by IntraMUSCular route once now and then repeat in 2-6 months (Patient not taking: Reported on 7/12/2022) 0.5 mL 1    [DISCONTINUED] allopurinoL (ZYLOPRIM) 300 mg tablet Take 1 Tab by mouth daily. 90 Tab 0     No current facility-administered medications on file prior to visit.        Allergies   Allergen Reactions    Ace Inhibitors Cough    Penicillins Hives    Seafood [Shellfish Containing Products] Other (comments)     All seafood aggravates gout    Shellfish Containing Products Other (comments)     gout    Terazosin Unknown (comments)     Incont       Past Medical History:   Diagnosis Date    Cancer ([facility] parial colocetomy in 8.2017    Contact dermatitis and other eczema, due to unspecified cause     rosacea; AK's    Gout     HTN (hypertension)     Hypercholesterolemia     Pre-diabetes        Past Surgical History:   Procedure Laterality Date    COLONOSCOPY N/A 7/21/2017    COLONOSCOPY, EGD performed by Andrea Nur MD at [address] HX COLONOSCOPY  2012 2012, 2017    HX ENDOSCOPY  2017    HX HEENT  1949    T & A    HX ORTHOPAEDIC  2000    bilateral hammertoe surgery    HX ORTHOPAEDIC  2002    r rotator cuff repair    DC ABDOMEN SURGERY PROC UNLISTED      colectomy in 2017       Family History   Problem Relation Age of Onset    Cancer Mother         lung, breast    Heart Failure Father     Hypertension Father     Coronary Art Dis Father         CABG at [de-identified]    OSTEOARTHRITIS Sister     Hypertension Sister     Obesity Daughter     No Known Problems Son        Social History     Socioeconomic History    Marital status:      Spouse name: Not on file    Number of children: Not on file    Years of education: Not on file    Highest education level: Not on file   Occupational History    Not on file   Tobacco Use    Smoking status: Never Smoker    Smokeless tobacco: Never Used   Vaping Use    Vaping Use: Never used   Substance and Sexual Activity    Alcohol use: No    Drug use: No    Sexual activity: Not Currently     Partners: Male   Other Topics Concern     Service Not Asked    Blood Transfusions Not Asked    Caffeine Concern No     Comment: avoids caffeine    Occupational Exposure Not Asked    Hobby Hazards Not Asked    Sleep Concern Not Asked    Stress Concern Not Asked    Weight Concern Not Asked    Special Diet Not Asked    Back Care Not Asked    Exercise Not Asked    Bike Helmet Not Asked    Seat Belt Not Asked    Self-Exams Not Asked   Social History Narrative    ** Merged History Encounter **            No visits with results within 3 Month(s) from this visit. Latest known visit with results is:   Office Visit on 08/24/2021   Component Date Value Ref Range Status    Glucose 08/24/2021 133* 65 - 99 mg/dL Final    BUN 08/24/2021 14  8 - 27 mg/dL Final    Creatinine 08/24/2021 0.95  0.57 - 1.00 mg/dL Final    GFR est non-AA 08/24/2021 58* >59 mL/min/1.73 Final    GFR est AA 08/24/2021 67  >59 mL/min/1.73 Final    Comment: **[facility] currently reports eGFR in compliance with the current**    recommendations of the [employer]. Nikki Roldan will    update reporting as new guidelines are published from the NKF-ASN    Task force.  BUN/Creatinine ratio 08/24/2021 15  12 - 28 Final    Sodium 08/24/2021 142  134 - 144 mmol/L Final    Potassium 08/24/2021 4.5  3.5 - 5.2 mmol/L Final    Chloride 08/24/2021 102  96 - 106 mmol/L Final    CO2 08/24/2021 26  20 - 29 mmol/L Final    Calcium 08/24/2021 9.6  8.7 - 10.3 mg/dL Final    Protein, total 08/24/2021 7.3  6.0 - 8.5 g/dL Final    Albumin 08/24/2021 4.7  3.7 - 4.7 g/dL Final    GLOBULIN, TOTAL 08/24/2021 2.6  1.5 - 4.5 g/dL Final    A-G Ratio 08/24/2021 1.8  1.2 - 2.2 Final    Bilirubin, total 08/24/2021 0.8  0.0 - 1.2 mg/dL Final    Alk.  phosphatase 08/24/2021 96  48 - 121 IU/L Final    AST (SGOT) 08/24/2021 27  0 - 40 IU/L Final    ALT (SGPT) 08/24/2021 18  0 - 32 IU/L Final    Cholesterol, total 08/24/2021 159  100 - 199 mg/dL Final    Triglyceride 08/24/2021 279* 0 - 149 mg/dL Final    HDL Cholesterol 08/24/2021 37* >39 mg/dL Final    VLDL, calculated 08/24/2021 45* 5 - 40 mg/dL Final    LDL, calculated 08/24/2021 77  0 - 99 mg/dL Final    Hemoglobin A1c 08/24/2021 5.8* 4.8 - 5.6 % Final    Comment:          Prediabetes: 5.7 - 6.4           Diabetes: >6.4           Glycemic control for adults with diabetes: <7.0      Estimated average glucose 08/24/2021 120  mg/dL Final    Hep C Virus Ab 08/24/2021 0.2  0.0 - 0.9 s/co ratio Final    Comment: Negative:     < 0.8                               Indeterminate: 0.8 - 0.9                                    Positive:     > 0.9   The CDC recommends that a positive HCV antibody result   be followed up with a HCV Nucleic Acid Amplification   test (529310). Review of Systems   Constitutional: Negative for activity change, fatigue and unexpected weight change. HENT: Negative for congestion, hearing loss, rhinorrhea and sore throat. Eyes: Negative for discharge. Respiratory: Negative for cough, chest tightness and shortness of breath. Cardiovascular: Negative for leg swelling. Gastrointestinal: Negative for abdominal pain, constipation and diarrhea. Genitourinary: Negative for dysuria, flank pain, frequency and urgency. Musculoskeletal: Negative for arthralgias, back pain and myalgias. Skin: Negative for color change and rash. Neurological: Negative for dizziness, light-headedness and headaches. Psychiatric/Behavioral: Negative for dysphoric mood and sleep disturbance. The patient is not nervous/anxious. Visit Vitals  /78 (BP 1 Location: Left upper arm, BP Patient Position: Sitting)   Pulse 80   Temp 97 °F (36.1 °C) (Temporal)   Resp 20   Ht 5' 2\" (1.575 m)   Wt 192 lb 3.2 oz (87.2 kg)   SpO2 94%   BMI 35.15 kg/m²      Physical Exam  Vitals and nursing note reviewed. Constitutional:       General: She is not in acute distress. Appearance: Normal appearance. She is well-developed. She is not diaphoretic. HENT:      Right Ear: External ear normal.      Left Ear: External ear normal.   Eyes:      General: No scleral icterus. Right eye: No discharge. Left eye: No discharge. Extraocular Movements: Extraocular movements intact. Conjunctiva/sclera: Conjunctivae normal.   Cardiovascular:      Rate and Rhythm: Normal rate and regular rhythm. Pulmonary:      Effort: Pulmonary effort is normal.      Breath sounds: Normal breath sounds.  No wheezing. Abdominal:      General: Bowel sounds are normal.      Palpations: Abdomen is soft. Tenderness: There is no abdominal tenderness. Musculoskeletal:      Cervical back: Normal range of motion and neck supple. Lymphadenopathy:      Cervical: No cervical adenopathy. Neurological:      Mental Status: She is alert and oriented to person, place, and time. Psychiatric:         Mood and Affect: Mood and affect normal.       An electronic signature was used to authenticate this note.   -- Robyn Kapoor

## 2022-07-12 NOTE — PROGRESS NOTES
1. \"Have you been to the ER, urgent care clinic since your last visit? Hospitalized since your last visit? \" No    2. \"Have you seen or consulted any other health care providers outside of the [address] [place] [address] since your last visit? \" Yes Where: [facility]    3. For patients aged 39-70: Has the patient had a colonoscopy / FIT/ Cologuard? NA - based on age      If the patient is female:    4. For patients aged 41-77: Has the patient had a mammogram within the past 2 years? NA - based on age or sex      11. For patients aged 21-65: Has the patient had a pap smear? NA - based on age or sex     Chief Complaint   Patient presents with   Bushra Curl Establish Care     Check up.     Labs     Fasting

## 2022-07-14 NOTE — PROGRESS NOTES
Results reviewed. Release via [employer],it was nice meeting with you at your recent visit. I left you a voicemail as well. Blood report is back and I need to ask you few things. Were you fasting for this blood work? Your Hemoglobin ( blood count)  came back high. Are you taking any Multivitamins with iron in it? Your Uric acid levels came high as well. I would recommend going on low dose of Allopurinol. Please respond to this message or we can do a video chat next week if you have questions or concerns about your blood work.

## 2022-07-18 DIAGNOSIS — E79.0 HYPERURICEMIA: Primary | ICD-10-CM

## 2022-07-18 RX ORDER — ALLOPURINOL 100 MG/1
100 TABLET ORAL DAILY
Qty: 30 TABLET | Refills: 2 | Status: SHIPPED | OUTPATIENT
Start: 2022-07-18 | End: 2022-10-16

## 2022-07-25 DIAGNOSIS — I10 BENIGN ESSENTIAL HYPERTENSION: ICD-10-CM

## 2022-07-26 RX ORDER — AMLODIPINE BESYLATE 10 MG/1
TABLET ORAL
Qty: 90 TABLET | Refills: 1 | Status: SHIPPED | OUTPATIENT
Start: 2022-07-26

## 2022-08-21 DIAGNOSIS — M79.18 MYOFASCIAL PAIN: Primary | ICD-10-CM

## 2022-08-27 DIAGNOSIS — I10 BENIGN ESSENTIAL HYPERTENSION: ICD-10-CM

## 2022-08-27 RX ORDER — VALSARTAN 160 MG/1
TABLET ORAL
Qty: 90 TABLET | Refills: 0 | Status: SHIPPED | OUTPATIENT
Start: 2022-08-27 | End: 2022-10-27 | Stop reason: SDUPTHER

## 2022-10-27 DIAGNOSIS — I10 BENIGN ESSENTIAL HYPERTENSION: ICD-10-CM

## 2022-10-31 RX ORDER — VALSARTAN 160 MG/1
160 TABLET ORAL DAILY
Qty: 90 TABLET | Refills: 0 | Status: SHIPPED | OUTPATIENT
Start: 2022-10-31

## 2022-12-08 DIAGNOSIS — E79.0 HYPERURICEMIA: ICD-10-CM

## 2022-12-08 DIAGNOSIS — I10 BENIGN ESSENTIAL HYPERTENSION: ICD-10-CM

## 2022-12-08 RX ORDER — VALSARTAN 160 MG/1
TABLET ORAL
Qty: 90 TABLET | Refills: 0 | Status: SHIPPED | OUTPATIENT
Start: 2022-12-08

## 2022-12-08 RX ORDER — ALLOPURINOL 100 MG/1
TABLET ORAL
Qty: 30 TABLET | Refills: 2 | Status: SHIPPED | OUTPATIENT
Start: 2022-12-08

## 2022-12-13 NOTE — PROGRESS NOTES
Written by Juice Officer, as dictated by Thiago Duffy MD.     ASSESSMENT and PLAN  Diagnoses and all orders for this visit:    1. Primary hypertension  Controlled. Continue with valsartan 160 mg daily. Discontinue amlodipine. Recheck CMP and CBC.   -     METABOLIC PANEL, COMPREHENSIVE; Future  -     CBC W/O DIFF; Future    2. Chronic gout of foot, unspecified cause, unspecified laterality  Uric acid was elevated when last checked. Will recheck today. Continue with allopurinol 100mg daily.   -     URIC ACID; Future    3. Hypertriglyceridemia  Triglycerides were high in July. Pt continues with fish oil tabs. Will recheck lipid panel today. -     LIPID PANEL; Future    4. IGT (impaired glucose tolerance)  Fasting glucose was elevated when last checked. Will check A1c today.   -     HEMOGLOBIN A1C WITH EAG; Future    5. History of colon cancer  Followed by gastroenterology. She gets colonoscopies every 3 years. HISTORY OF PRESENT ILLNESS  Krish Colbert is a 65 y.o. female. Here today for AWV and follow up on chronic conditions. She is fasting today. Pt has been working on weight loss Pt's weight today is 176lb, down from 192lb on 7/12/22. Since May, she has been counting calories and has reduced consumption of sugar. She plays golf for exercise and is considering joining the gym. Her next weight goal is 165 lbs. BP today is 137/83 and 132/82 on manual recheck Pt is taking valsartan 160 mg daily (hasn't taken yet today, usually takes around lunch time). She stopped taking amlodipine 10 mg daily due to her weight loss. She checks BP at home sometimes. She denies feeling lightheaded/dizzy or SOB. Lipid panel on 7/12/22 notable for total cholesterol 183, HDL 39, LDL 87, and triglycerides 285. Pt is taking fish oil tabs. Uric acid level was elevated when last checked. Pt is taking allopurinol 100mg daily for gout. She denies recent gout flare ups.      She takes Tylenol PM every night to help her sleep. She sleeps 11pm-8am every night. She doesn't wake up to go to the bathroom. Pt reports an episode of double vision about one month ago. She saw ophthalmology who believed this was due to a tried eye muscle. She hasn't had any episodes since. She gets colonoscopies every 3 years with Dr. Jules Huff. She has history of colon cancer. Patient Active Problem List   Diagnosis Code    Benign essential hypertension I10    Gout M10.9    Hypertriglyceridemia E78.1    H/O mammogram Z92.89    Right bundle branch block I45.10    Low HDL (under 40) E78.6    Open angle with borderline findings, low risk H40.019    ACP (advance care planning) Z71.89    Severe obesity (BMI 35.0-39. 9) with comorbidity ([facility] [address] E66.01    History of colon cancer Z85.038        Current Outpatient Medications on File Prior to Visit   Medication Sig Dispense Refill    docosahexaenoic acid/epa (FISH OIL PO) Take  by mouth. cholecalciferol, vitamin D3, (VITAMIN D3 PO) Take  by mouth.      valsartan (DIOVAN) 160 mg tablet TAKE 1 TABLET BY MOUTH EVERY DAY 90 Tablet 0    allopurinoL (ZYLOPRIM) 100 mg tablet TAKE 1 TABLET BY MOUTH EVERY DAY 30 Tablet 2    [DISCONTINUED] amLODIPine (NORVASC) 10 mg tablet TAKE 1 TABLET BY MOUTH EVERY DAY (Patient not taking: Reported on 12/14/2022) 90 Tablet 1    [DISCONTINUED] omeprazole (PRILOSEC) 20 mg capsule TAKE 1 CAPSULE BY MOUTH EVERY DAY (Patient not taking: Reported on 12/14/2022) 90 Capsule 1    [DISCONTINUED] nystatin (MYCOSTATIN) topical cream Apply  to affected area two (2) times a day. (Patient not taking: No sig reported) 15 g 0     No current facility-administered medications on file prior to visit.        Allergies   Allergen Reactions    Ace Inhibitors Cough    Penicillins Hives    Seafood [Shellfish Containing Products] Other (comments)     All seafood aggravates gout    Shellfish Containing Products Other (comments)     gout    Terazosin Unknown (comments)     Incont       Past Medical History:   Diagnosis Date    Cancer ([facility] [address]     parial colocetomy in 8.2017    Contact dermatitis and other eczema, due to unspecified cause     rosacea; AK's    Gout     HTN (hypertension)     Hypercholesterolemia     Pre-diabetes        Past Surgical History:   Procedure Laterality Date    COLONOSCOPY N/A 7/21/2017    COLONOSCOPY, EGD performed by Thyra Jeans, MD at [facility] ENDOSCOPY    HX COLONOSCOPY  2012 2012, 2017    HX ENDOSCOPY  2017    HX HEENT  1949    T & A    HX ORTHOPAEDIC  2000    bilateral hammertoe surgery    HX ORTHOPAEDIC  2002    r rotator cuff repair    MO ABDOMEN SURGERY PROC UNLISTED      colectomy in 2017       Family History   Problem Relation Age of Onset    Cancer Mother         lung, breast    Heart Failure Father     Hypertension Father     Coronary Art Dis Father         CABG at [de-identified]    OSTEOARTHRITIS Sister     Hypertension Sister     Obesity Daughter     No Known Problems Son        Social History     Socioeconomic History    Marital status:      Spouse name: Not on file    Number of children: Not on file    Years of education: Not on file    Highest education level: Not on file   Occupational History    Not on file   Tobacco Use    Smoking status: Never    Smokeless tobacco: Never   Vaping Use    Vaping Use: Never used   Substance and Sexual Activity    Alcohol use: No    Drug use: No    Sexual activity: Not Currently     Partners: Male   Other Topics Concern     Service Not Asked    Blood Transfusions Not Asked    Caffeine Concern No     Comment: avoids caffeine    Occupational Exposure Not Asked    Hobby Hazards Not Asked    Sleep Concern Not Asked    Stress Concern Not Asked    Weight Concern Not Asked    Special Diet Not Asked    Back Care Not Asked    Exercise Not Asked    Bike Helmet Not Asked    Seat Belt Not Asked    Self-Exams Not Asked   Social History Narrative    ** Merged History Encounter **          Social Determinants of Health     Financial Resource Strain: Not on file   Food Insecurity: Not on file   Transportation Needs: Not on file   Physical Activity: Unknown    Days of Exercise per Week: 2 days    Minutes of Exercise per Session: Patient refused   Stress: Not on file   Social Connections: Not on file   Intimate Partner Violence: Not At Risk    Fear of Current or Ex-Partner: No    Emotionally Abused: No    Physically Abused: No    Sexually Abused: No   Housing Stability: Not on file       No visits with results within 3 Month(s) from this visit. Latest known visit with results is:   Office Visit on 07/12/2022   Component Date Value Ref Range Status    CEA 07/12/2022 1.4  ng/mL Final    Comment:  Non-smokers:   0.0 - 3.0 ng/mL   Smokers:       0.0 - 5.0 ng/mL  Results may vary depending on . Method used is FamilyID      Cholesterol, total 07/12/2022 183  <200 MG/DL Final    Triglyceride 07/12/2022 285 (A)  <150 MG/DL Final    Based on NCEP-ATP III:  Triglycerides <150 mg/dL  is considered normal, 150-199 mg/dL  borderline high,  200-499 mg/dL high and  greater than or equal to 500 mg/dL very high. HDL Cholesterol 07/12/2022 39  MG/DL Final    Based on NCEP ATP III, HDL Cholesterol <40 mg/dL is considered low and >60 mg/dL is elevated.     LDL, calculated 07/12/2022 87  0 - 100 MG/DL Final    Comment: Based on the NCEP-ATP: LDL-C concentrations are considered  optimal <100 mg/dL,  near optimal/above Normal 100-129 mg/dL  Borderline High: 130-159, High: 160-189 mg/dL  Very High: Greater than or equal to 190 mg/dL      VLDL, calculated 07/12/2022 57  MG/DL Final    CHOL/HDL Ratio 07/12/2022 4.7  0.0 - 5.0   Final    WBC 07/12/2022 4.4  3.6 - 11.0 K/uL Final    RBC 07/12/2022 5.10  3.80 - 5.20 M/uL Final    HGB 07/12/2022 16.5 (A)  11.5 - 16.0 g/dL Final    HCT 07/12/2022 47.5 (A)  35.0 - 47.0 % Final    MCV 07/12/2022 93.1  80.0 - 99.0 FL Final    MCH 07/12/2022 32.4  26.0 - 34.0 PG Final    MCHC 07/12/2022 34.7  30.0 - 36.5 g/dL Final    RDW 07/12/2022 12.9  11.5 - 14.5 % Final    PLATELET 37/11/2956 129  150 - 400 K/uL Final    MPV 07/12/2022 9.7  8.9 - 12.9 FL Final    NRBC 07/12/2022 0.0  0  WBC Final    ABSOLUTE NRBC 07/12/2022 0.00  0.00 - 0.01 K/uL Final    Sodium 07/12/2022 141  136 - 145 mmol/L Final    Potassium 07/12/2022 4.2  3.5 - 5.1 mmol/L Final    Chloride 07/12/2022 106  97 - 108 mmol/L Final    CO2 07/12/2022 28  21 - 32 mmol/L Final    Anion gap 07/12/2022 7  5 - 15 mmol/L Final    Glucose 07/12/2022 104 (A)  65 - 100 mg/dL Final    BUN 07/12/2022 18  6 - 20 MG/DL Final    Creatinine 07/12/2022 0.94  0.55 - 1.02 MG/DL Final    BUN/Creatinine ratio 07/12/2022 19  12 - 20   Final    GFR est AA 07/12/2022 >60  >60 ml/min/1.73m2 Final    GFR est non-AA 07/12/2022 58 (A)  >60 ml/min/1.73m2 Final    Estimated GFR is calculated using the IDMS-traceable Modification of Diet in Renal Disease (MDRD) Study equation, reported for both  Americans (GFRAA) and non- Americans (GFRNA), and normalized to 1.73m2 body surface area. The physician must decide which value applies to the patient. Calcium 07/12/2022 9.7  8.5 - 10.1 MG/DL Final    Bilirubin, total 07/12/2022 0.8  0.2 - 1.0 MG/DL Final    ALT (SGPT) 07/12/2022 18  12 - 78 U/L Final    AST (SGOT) 07/12/2022 20  15 - 37 U/L Final    Alk. phosphatase 07/12/2022 82  45 - 117 U/L Final    Protein, total 07/12/2022 7.4  6.4 - 8.2 g/dL Final    Albumin 07/12/2022 4.1  3.5 - 5.0 g/dL Final    Globulin 07/12/2022 3.3  2.0 - 4.0 g/dL Final    A-G Ratio 07/12/2022 1.2  1.1 - 2.2   Final    Uric acid 07/12/2022 8.1 (A)  2.6 - 6.0 MG/DL Final       Review of Systems   Constitutional:  Negative for malaise/fatigue and weight loss. HENT:  Negative for congestion and sore throat. Eyes:  Negative for blurred vision. Respiratory:  Negative for cough and shortness of breath. Cardiovascular:  Negative for chest pain and leg swelling.    Gastrointestinal:  Negative for constipation and heartburn. Genitourinary:  Negative for frequency and urgency. Musculoskeletal:  Negative for back pain, joint pain and myalgias. Neurological:  Negative for dizziness and headaches. Psychiatric/Behavioral:  Negative for depression. The patient is not nervous/anxious and does not have insomnia. Visit Vitals  /82 (BP 1 Location: Left upper arm, BP Patient Position: Sitting)   Pulse 78   Temp 97 °F (36.1 °C) (Temporal)   Resp 18   Ht 5' 2\" (1.575 m)   Wt 176 lb 6.4 oz (80 kg)   SpO2 97%   BMI 32.26 kg/m²   '  Physical Exam  Vitals and nursing note reviewed. Constitutional:       General: She is not in acute distress. Appearance: Normal appearance. She is not diaphoretic. HENT:      Right Ear: External ear normal.      Left Ear: External ear normal.   Eyes:      General: No scleral icterus. Right eye: No discharge. Left eye: No discharge. Extraocular Movements: Extraocular movements intact. Conjunctiva/sclera: Conjunctivae normal.   Cardiovascular:      Rate and Rhythm: Normal rate and regular rhythm. Pulmonary:      Effort: Pulmonary effort is normal.      Breath sounds: Normal breath sounds. No wheezing. Abdominal:      General: Bowel sounds are normal.      Palpations: Abdomen is soft. Musculoskeletal:      Cervical back: Normal range of motion and neck supple. Lymphadenopathy:      Cervical: No cervical adenopathy. Neurological:      Mental Status: She is alert and oriented to person, place, and time.    Psychiatric:         Mood and Affect: Mood and affect normal.

## 2022-12-14 ENCOUNTER — OFFICE VISIT (OUTPATIENT)
Dept: PRIMARY CARE CLINIC | Age: 77
End: 2022-12-14
Payer: MEDICARE

## 2022-12-14 VITALS
SYSTOLIC BLOOD PRESSURE: 132 MMHG | HEART RATE: 78 BPM | BODY MASS INDEX: 32.46 KG/M2 | DIASTOLIC BLOOD PRESSURE: 82 MMHG | RESPIRATION RATE: 18 BRPM | OXYGEN SATURATION: 97 % | TEMPERATURE: 97 F | HEIGHT: 62 IN | WEIGHT: 176.4 LBS

## 2022-12-14 DIAGNOSIS — Z00.00 MEDICARE ANNUAL WELLNESS VISIT, SUBSEQUENT: Primary | ICD-10-CM

## 2022-12-14 DIAGNOSIS — I10 PRIMARY HYPERTENSION: ICD-10-CM

## 2022-12-14 DIAGNOSIS — R73.02 IGT (IMPAIRED GLUCOSE TOLERANCE): ICD-10-CM

## 2022-12-14 DIAGNOSIS — M1A.0790 CHRONIC GOUT OF FOOT, UNSPECIFIED CAUSE, UNSPECIFIED LATERALITY: ICD-10-CM

## 2022-12-14 DIAGNOSIS — E78.1 HYPERTRIGLYCERIDEMIA: ICD-10-CM

## 2022-12-14 DIAGNOSIS — Z12.31 ENCOUNTER FOR SCREENING MAMMOGRAM FOR MALIGNANT NEOPLASM OF BREAST: ICD-10-CM

## 2022-12-14 DIAGNOSIS — Z85.038 HISTORY OF COLON CANCER: ICD-10-CM

## 2022-12-14 LAB
ALBUMIN SERPL-MCNC: 3.9 G/DL (ref 3.5–5)
ALBUMIN/GLOB SERPL: 1.4 {RATIO} (ref 1.1–2.2)
ALP SERPL-CCNC: 73 U/L (ref 45–117)
ALT SERPL-CCNC: 20 U/L (ref 12–78)
ANION GAP SERPL CALC-SCNC: 2 MMOL/L (ref 5–15)
AST SERPL-CCNC: 22 U/L (ref 15–37)
BILIRUB SERPL-MCNC: 0.8 MG/DL (ref 0.2–1)
BUN SERPL-MCNC: 18 MG/DL (ref 6–20)
BUN/CREAT SERPL: 21 (ref 12–20)
CALCIUM SERPL-MCNC: 8.7 MG/DL (ref 8.5–10.1)
CHLORIDE SERPL-SCNC: 106 MMOL/L (ref 97–108)
CHOLEST SERPL-MCNC: 200 MG/DL
CO2 SERPL-SCNC: 30 MMOL/L (ref 21–32)
COMMENT, HOLDF: NORMAL
CREAT SERPL-MCNC: 0.87 MG/DL (ref 0.55–1.02)
ERYTHROCYTE [DISTWIDTH] IN BLOOD BY AUTOMATED COUNT: 12.6 % (ref 11.5–14.5)
EST. AVERAGE GLUCOSE BLD GHB EST-MCNC: 100 MG/DL
GLOBULIN SER CALC-MCNC: 2.8 G/DL (ref 2–4)
GLUCOSE SERPL-MCNC: 118 MG/DL (ref 65–100)
HBA1C MFR BLD: 5.1 % (ref 4–5.6)
HCT VFR BLD AUTO: 48.3 % (ref 35–47)
HDLC SERPL-MCNC: 46 MG/DL
HDLC SERPL: 4.3 {RATIO} (ref 0–5)
HGB BLD-MCNC: 16.4 G/DL (ref 11.5–16)
LDLC SERPL CALC-MCNC: 104.8 MG/DL (ref 0–100)
MCH RBC QN AUTO: 32 PG (ref 26–34)
MCHC RBC AUTO-ENTMCNC: 34 G/DL (ref 30–36.5)
MCV RBC AUTO: 94.3 FL (ref 80–99)
NRBC # BLD: 0 K/UL (ref 0–0.01)
NRBC BLD-RTO: 0 PER 100 WBC
PLATELET # BLD AUTO: 150 K/UL (ref 150–400)
PMV BLD AUTO: 9.7 FL (ref 8.9–12.9)
POTASSIUM SERPL-SCNC: 4.1 MMOL/L (ref 3.5–5.1)
PROT SERPL-MCNC: 6.7 G/DL (ref 6.4–8.2)
RBC # BLD AUTO: 5.12 M/UL (ref 3.8–5.2)
SAMPLES BEING HELD,HOLD: NORMAL
SODIUM SERPL-SCNC: 138 MMOL/L (ref 136–145)
TRIGL SERPL-MCNC: 246 MG/DL (ref ?–150)
URATE SERPL-MCNC: 6.5 MG/DL (ref 2.6–6)
VLDLC SERPL CALC-MCNC: 49.2 MG/DL
WBC # BLD AUTO: 4.2 K/UL (ref 3.6–11)

## 2022-12-14 PROCEDURE — 3078F DIAST BP <80 MM HG: CPT | Performed by: INTERNAL MEDICINE

## 2022-12-14 PROCEDURE — G0439 PPPS, SUBSEQ VISIT: HCPCS | Performed by: INTERNAL MEDICINE

## 2022-12-14 PROCEDURE — G8752 SYS BP LESS 140: HCPCS | Performed by: INTERNAL MEDICINE

## 2022-12-14 PROCEDURE — 1101F PT FALLS ASSESS-DOCD LE1/YR: CPT | Performed by: INTERNAL MEDICINE

## 2022-12-14 PROCEDURE — G8399 PT W/DXA RESULTS DOCUMENT: HCPCS | Performed by: INTERNAL MEDICINE

## 2022-12-14 PROCEDURE — 1090F PRES/ABSN URINE INCON ASSESS: CPT | Performed by: INTERNAL MEDICINE

## 2022-12-14 PROCEDURE — G8427 DOCREV CUR MEDS BY ELIG CLIN: HCPCS | Performed by: INTERNAL MEDICINE

## 2022-12-14 PROCEDURE — 1123F ACP DISCUSS/DSCN MKR DOCD: CPT | Performed by: INTERNAL MEDICINE

## 2022-12-14 PROCEDURE — G8536 NO DOC ELDER MAL SCRN: HCPCS | Performed by: INTERNAL MEDICINE

## 2022-12-14 PROCEDURE — 99214 OFFICE O/P EST MOD 30 MIN: CPT | Performed by: INTERNAL MEDICINE

## 2022-12-14 PROCEDURE — G8419 CALC BMI OUT NRM PARAM NOF/U: HCPCS | Performed by: INTERNAL MEDICINE

## 2022-12-14 PROCEDURE — G8432 DEP SCR NOT DOC, RNG: HCPCS | Performed by: INTERNAL MEDICINE

## 2022-12-14 PROCEDURE — 3074F SYST BP LT 130 MM HG: CPT | Performed by: INTERNAL MEDICINE

## 2022-12-14 PROCEDURE — G8754 DIAS BP LESS 90: HCPCS | Performed by: INTERNAL MEDICINE

## 2022-12-14 NOTE — PROGRESS NOTES
1. \"Have you been to the ER, urgent care clinic since your last visit? Hospitalized since your last visit? \" No    2. \"Have you seen or consulted any other health care providers outside of the [address] [place] [address] since your last visit? \" Yes When: Eye doctor       3. For patients aged 39-70: Has the patient had a colonoscopy / FIT/ Cologuard? Yes - no Care Gap present      If the patient is female:    4. For patients aged 41-77: Has the patient had a mammogram within the past 2 years? Yes - no Care Gap present  Not within the past year. 5. For patients aged 21-65: Has the patient had a pap smear? NA - based on age or sex       Chief Complaint   Patient presents with    Annual Wellness Visit           Jonas Valdez is a 65 y.o. female and presents for Annual Medicare Wellness Visit. Assessment of cognitive impairment: Alert and oriented x 3     Depression Screen:   3 most recent PHQ Screens 7/12/2022   Little interest or pleasure in doing things Not at all   Feeling down, depressed, irritable, or hopeless Not at all   Total Score PHQ 2 0       . Fall Risk Assessment, last 12 mths 12/14/2022   Able to walk? Yes   Fall in past 12 months? 0   Do you feel unsteady? 0   Are you worried about falling 0         . Abuse Screening Questionnaire 12/14/2022   Do you ever feel afraid of your partner? N   Are you in a relationship with someone who physically or mentally threatens you? N   Is it safe for you to go home? Y      .   ADL Assessment 12/14/2022   Feeding yourself No Help Needed   Getting from bed to chair No Help Needed   Getting dressed No Help Needed   Bathing or showering No Help Needed   Walk across the room (includes cane/walker) No Help Needed   Using the telphone No Help Needed   Taking your medications No Help Needed   Preparing meals No Help Needed   Managing money (expenses/bills) No Help Needed   Moderately strenuous housework (laundry) No Help Needed   Shopping for personal items (toiletries/medicines) No Help Needed   Shopping for groceries No Help Needed   Driving No Help Needed   Climbing a flight of stairs No Help Needed   Getting to places beyond walking distances No Help Needed        Activities of Daily Living:  Self-care. Requires assistance with: no ADLs  Patient handle his/her own medications  Yes Use of pill box  no  Activities of Daily Living: does fine. Health Maintenance:  Daily Aspirin: no  Bone Density: up to date was normal few years ago   Glaucoma Screening: yes  Immunizations:    Tetanus: up to date. Influenza: up to date. Shingles: up to date. PPSV-23: up to date. Prevnar-13: up to date. DNTYB72: up to date    Cancer screening:    Cervical: N/A age . Breast: not up to date , will do this year   Colon: UP to date . Every 3 year schedule. Alcohol Risk Screen:   On any occasion during the past 3 months, have you had more than 3 drinks(female) or 4 drinks (male) containing alcohol in one? No  Do you average more than 7 drinks (female) or 14 drinks (male) per week? No  Type and amount: None    Hearing Loss:  Hearing is good. Vision Loss:   Wears glasses, contact lenses, or have any other visual impairment  no    Adult Nutrition Screen:  No risk factors noted. Advance Care Planning:   End of Life Planning: has an advanced directive - a copy has been provided  [facility] ACP-Facilitator appointment no      Medications/Allergies: Reviewed with patient  Prior to Admission medications    Medication Sig Start Date End Date Taking?  Authorizing Provider   valsartan (DIOVAN) 160 mg tablet TAKE 1 TABLET BY MOUTH EVERY DAY 12/8/22   Lennox Speaks, MD   allopurinoL (ZYLOPRIM) 100 mg tablet TAKE 1 TABLET BY MOUTH EVERY DAY 12/8/22   Lennox Speaks, MD   amLODIPine (NORVASC) 10 mg tablet TAKE 1 TABLET BY MOUTH EVERY DAY 7/26/22   Lennox Speaks, MD   omeprazole (PRILOSEC) 20 mg capsule TAKE 1 CAPSULE BY MOUTH EVERY DAY 1/3/22   Calvin Severe, MD nystatin (MYCOSTATIN) topical cream Apply  to affected area two (2) times a day. Patient not taking: Reported on 7/12/2022 9/1/21   Sadaf Childs MD       Allergies   Allergen Reactions    Ace Inhibitors Cough    Penicillins Hives    Seafood [Shellfish Containing Products] Other (comments)     All seafood aggravates gout    Shellfish Containing Products Other (comments)     gout    Terazosin Unknown (comments)     Incont       Objective:  Visit Vitals  /82 (BP 1 Location: Left upper arm, BP Patient Position: Sitting)   Pulse 78   Temp 97 °F (36.1 °C) (Temporal)   Resp 18   Ht 5' 2\" (1.575 m)   Wt 176 lb 6.4 oz (80 kg)   SpO2 97%   BMI 32.26 kg/m²           Problem List: Reviewed with patient and discussed risk factors. Patient Active Problem List   Diagnosis Code    Benign essential hypertension I10    Gout M10.9    Hypertriglyceridemia E78.1    H/O mammogram Z92.89    H/O colonoscopy Z98.890    Right bundle branch block I45.10    Low HDL (under 40) E78.6    Open angle with borderline findings, low risk H40.019    ACP (advance care planning) Z71.89    Severe obesity (BMI 35.0-39. 9) with comorbidity ([address] E66.01    History of colon cancer Z85.038       PSH: Reviewed with patient  Past Surgical History:   Procedure Laterality Date    COLONOSCOPY N/A 7/21/2017    COLONOSCOPY, EGD performed by Lilo Davis MD at [facility] ENDOSCOPY    HX COLONOSCOPY  2012 2012, 2017    HX ENDOSCOPY  2017    HX HEENT  1949    T & A    HX ORTHOPAEDIC  2000    bilateral hammertoe surgery    HX ORTHOPAEDIC  2002    r rotator cuff repair    MA ABDOMEN SURGERY PROC UNLISTED      colectomy in 2017        SH: Reviewed with patient  Social History     Tobacco Use    Smoking status: Never    Smokeless tobacco: Never   Vaping Use    Vaping Use: Never used   Substance Use Topics    Alcohol use: No    Drug use: No       FH: Reviewed with patient  Family History   Problem Relation Age of Onset    Cancer Mother         lung, breast Heart Failure Father     Hypertension Father     Coronary Art Dis Father         CABG at [de-identified]    OSTEOARTHRITIS Sister     Hypertension Sister     Obesity Daughter     No Known Problems Son        Current medical providers:    Patient Care Team:  Kathy Skinner MD as PCP - General (Internal Medicine Physician)  Kathy Skinner MD as PCP - [facility] Empaneled Provider  Inez Guerin DO (Obstetrics & Gynecology)  Barry Carreno MD (Hematology and Oncology)    Plan:    Diagnoses and all orders for this visit:    Medicare annual wellness visit, subsequent  . Age appropriate Health screening and immunization discussed with patient. Encounter for screening mammogram for malignant neoplasm of breast  -     ALYSE 3D TANA W MAMMO BI SCREENING INCL CAD; Future      Health Maintenance   Topic Date Due    Medicare Yearly Exam  08/25/2022    COVID-19 Vaccine (5 - Booster for Moderna series) 12/29/2022    DTaP/Tdap/Td series (2 - Td or Tdap) 01/09/2023    Depression Screen  07/12/2023    Hepatitis C Screening  Completed    Bone Densitometry (Dexa) Screening  Completed    Shingrix Vaccine Age 50>  Completed    Flu Vaccine  Completed    Pneumococcal 65+ years  Completed          Urinary/ Fecal Incontinence: None     Regular physical exercise: Golfs in summer 3 days a week. In the winter not so much. Patient verbalized understanding of information presented. AVS and Medicare Part B Preventive Services Table printed and given to pt and reviewed. See table for findings under Recommendation and Scheduled. All of the patient's questions were answered.

## 2022-12-16 LAB
EST. AVERAGE GLUCOSE BLD GHB EST-MCNC: 100 MG/DL
HBA1C MFR BLD: 5.1 % (ref 4–5.6)

## 2023-02-15 ENCOUNTER — PATIENT MESSAGE (OUTPATIENT)
Dept: PRIMARY CARE CLINIC | Age: 78
End: 2023-02-15

## 2023-02-15 DIAGNOSIS — E79.0 HYPERURICEMIA: ICD-10-CM

## 2023-02-15 RX ORDER — ALLOPURINOL 100 MG/1
100 TABLET ORAL DAILY
Qty: 90 TABLET | Refills: 0 | Status: SHIPPED | OUTPATIENT
Start: 2023-02-15 | End: 2023-05-16

## 2023-03-26 DIAGNOSIS — I10 BENIGN ESSENTIAL HYPERTENSION: ICD-10-CM

## 2023-03-28 RX ORDER — VALSARTAN 160 MG/1
160 TABLET ORAL DAILY
Qty: 90 TABLET | Refills: 0 | Status: SHIPPED | OUTPATIENT
Start: 2023-03-28

## 2023-03-28 NOTE — TELEPHONE ENCOUNTER
Requested Prescriptions     Pending Prescriptions Disp Refills    valsartan (DIOVAN) 160 mg tablet 90 Tablet 0     Sig: Take 1 Tablet by mouth daily.         Last Visit 12/14/22  Last Refill 12/8/22

## 2023-06-26 DIAGNOSIS — I10 ESSENTIAL (PRIMARY) HYPERTENSION: ICD-10-CM

## 2023-06-26 RX ORDER — VALSARTAN 160 MG/1
160 TABLET ORAL DAILY
Qty: 90 TABLET | Refills: 0 | Status: SHIPPED | OUTPATIENT
Start: 2023-06-26 | End: 2023-09-24

## 2023-07-08 DIAGNOSIS — K21.9 GASTRO-ESOPHAGEAL REFLUX DISEASE WITHOUT ESOPHAGITIS: ICD-10-CM

## 2023-07-09 RX ORDER — OMEPRAZOLE 20 MG/1
CAPSULE, DELAYED RELEASE ORAL
Qty: 90 CAPSULE | Refills: 0 | Status: SHIPPED | OUTPATIENT
Start: 2023-07-09

## 2023-08-05 DIAGNOSIS — E79.0 HYPERURICEMIA WITHOUT SIGNS OF INFLAMMATORY ARTHRITIS AND TOPHACEOUS DISEASE: ICD-10-CM

## 2023-08-06 RX ORDER — ALLOPURINOL 100 MG/1
TABLET ORAL
Qty: 60 TABLET | Refills: 1 | Status: SHIPPED | OUTPATIENT
Start: 2023-08-06

## 2023-08-08 ENCOUNTER — TELEPHONE (OUTPATIENT)
Dept: PRIMARY CARE CLINIC | Facility: CLINIC | Age: 78
End: 2023-08-08

## 2023-08-08 ENCOUNTER — OFFICE VISIT (OUTPATIENT)
Dept: PRIMARY CARE CLINIC | Facility: CLINIC | Age: 78
End: 2023-08-08
Payer: MEDICARE

## 2023-08-08 VITALS
BODY MASS INDEX: 30.69 KG/M2 | DIASTOLIC BLOOD PRESSURE: 82 MMHG | OXYGEN SATURATION: 94 % | HEIGHT: 62 IN | RESPIRATION RATE: 16 BRPM | TEMPERATURE: 97.1 F | HEART RATE: 76 BPM | WEIGHT: 166.8 LBS | SYSTOLIC BLOOD PRESSURE: 144 MMHG

## 2023-08-08 DIAGNOSIS — E79.0 HYPERURICEMIA: ICD-10-CM

## 2023-08-08 DIAGNOSIS — I10 PRIMARY HYPERTENSION: ICD-10-CM

## 2023-08-08 DIAGNOSIS — M85.88 OSTEOPENIA OF OTHER SITE: ICD-10-CM

## 2023-08-08 DIAGNOSIS — E78.2 MIXED HYPERLIPIDEMIA: ICD-10-CM

## 2023-08-08 DIAGNOSIS — I10 PRIMARY HYPERTENSION: Primary | ICD-10-CM

## 2023-08-08 PROCEDURE — 1090F PRES/ABSN URINE INCON ASSESS: CPT | Performed by: INTERNAL MEDICINE

## 2023-08-08 PROCEDURE — 3077F SYST BP >= 140 MM HG: CPT | Performed by: INTERNAL MEDICINE

## 2023-08-08 PROCEDURE — G8419 CALC BMI OUT NRM PARAM NOF/U: HCPCS | Performed by: INTERNAL MEDICINE

## 2023-08-08 PROCEDURE — 1123F ACP DISCUSS/DSCN MKR DOCD: CPT | Performed by: INTERNAL MEDICINE

## 2023-08-08 PROCEDURE — 99214 OFFICE O/P EST MOD 30 MIN: CPT | Performed by: INTERNAL MEDICINE

## 2023-08-08 PROCEDURE — 1036F TOBACCO NON-USER: CPT | Performed by: INTERNAL MEDICINE

## 2023-08-08 PROCEDURE — G8400 PT W/DXA NO RESULTS DOC: HCPCS | Performed by: INTERNAL MEDICINE

## 2023-08-08 PROCEDURE — G8427 DOCREV CUR MEDS BY ELIG CLIN: HCPCS | Performed by: INTERNAL MEDICINE

## 2023-08-08 PROCEDURE — 3079F DIAST BP 80-89 MM HG: CPT | Performed by: INTERNAL MEDICINE

## 2023-08-08 RX ORDER — AMLODIPINE AND VALSARTAN 5; 160 MG/1; MG/1
1 TABLET ORAL DAILY
Qty: 90 TABLET | Refills: 0 | Status: SHIPPED | OUTPATIENT
Start: 2023-08-08 | End: 2023-11-06

## 2023-08-08 RX ORDER — ALLOPURINOL 100 MG/1
100 TABLET ORAL DAILY
Qty: 90 TABLET | Refills: 0 | Status: CANCELLED | OUTPATIENT
Start: 2023-08-08

## 2023-08-08 RX ORDER — VALSARTAN 160 MG/1
160 TABLET ORAL DAILY
Qty: 90 TABLET | Refills: 0 | Status: CANCELLED | OUTPATIENT
Start: 2023-08-08 | End: 2023-11-06

## 2023-08-08 SDOH — ECONOMIC STABILITY: HOUSING INSECURITY
IN THE LAST 12 MONTHS, WAS THERE A TIME WHEN YOU DID NOT HAVE A STEADY PLACE TO SLEEP OR SLEPT IN A SHELTER (INCLUDING NOW)?: PATIENT REFUSED

## 2023-08-08 SDOH — ECONOMIC STABILITY: INCOME INSECURITY: HOW HARD IS IT FOR YOU TO PAY FOR THE VERY BASICS LIKE FOOD, HOUSING, MEDICAL CARE, AND HEATING?: PATIENT DECLINED

## 2023-08-08 SDOH — ECONOMIC STABILITY: FOOD INSECURITY: WITHIN THE PAST 12 MONTHS, YOU WORRIED THAT YOUR FOOD WOULD RUN OUT BEFORE YOU GOT MONEY TO BUY MORE.: PATIENT DECLINED

## 2023-08-08 SDOH — ECONOMIC STABILITY: FOOD INSECURITY: WITHIN THE PAST 12 MONTHS, THE FOOD YOU BOUGHT JUST DIDN'T LAST AND YOU DIDN'T HAVE MONEY TO GET MORE.: PATIENT DECLINED

## 2023-08-08 ASSESSMENT — ENCOUNTER SYMPTOMS
COLOR CHANGE: 0
SHORTNESS OF BREATH: 0
BACK PAIN: 0
ABDOMINAL PAIN: 0
DIARRHEA: 0
CONSTIPATION: 0
CHEST TIGHTNESS: 0
SORE THROAT: 0
RHINORRHEA: 0
EYE DISCHARGE: 0
COUGH: 0

## 2023-08-08 ASSESSMENT — PATIENT HEALTH QUESTIONNAIRE - PHQ9
SUM OF ALL RESPONSES TO PHQ QUESTIONS 1-9: 0
SUM OF ALL RESPONSES TO PHQ9 QUESTIONS 1 & 2: 0
1. LITTLE INTEREST OR PLEASURE IN DOING THINGS: 0
SUM OF ALL RESPONSES TO PHQ QUESTIONS 1-9: 0
2. FEELING DOWN, DEPRESSED OR HOPELESS: 0

## 2023-08-08 NOTE — PROGRESS NOTES
Tamra Pfeiffer (:  1945) is a 65 y.o. female, Established patient, here for evaluation of the following chief complaint(s):  Follow-up (Every 6 months )           ASSESSMENT/PLAN:  1. Primary hypertension  -     Comprehensive Metabolic Panel; Future  -     amLODIPine-valsartan (EXFORGE) 5-160 MG per tablet; Take 1 tablet by mouth daily, Disp-90 tablet, R-0Normal sent to pharmacy. I ordered a CMP. I prescribed amlodipine-valsartan 5-160 mg daily. Potential side effects were discussed. I recommend that she check her BP regularly and make a log of her BP. 2. Hyperuricemia  -     Uric Acid; Future  I ordered blood work to check her uric acid levels. I recommend that she continue taking allopurinol 100 mg daily. 3. Mixed hyperlipidemia  -     Lipid Panel; Future  I ordered a lipid panel. 4. Osteopenia of other site  -     DEXA BONE DENSITY AXIAL SKELETON; Future  I ordered a DEXA scan. Subjective   SUBJECTIVE/OBJECTIVE:  HPI    Patient presents today for a follow up for chronic conditions. She is fasting today. Pt's BP is elevated today in office at 158/83, 144/82 on manual repeat in left arm while sitting down. She is taking valsartan 160 mg daily but did not take it today. She checks her BP at home and states that it runs around 140s/82. She denies headaches or dizziness. She has lost 10 lb since 22. She is trying to lose another 15 lb. She stays well hydrated. She is taking allopurinol 100 mg daily for gout. She denies any gout issues. She plays golf twice a week. Patient Active Problem List   Diagnosis    Right bundle branch block    Hypertriglyceridemia    Benign essential hypertension    Open angle with borderline findings, low risk    ACP (advance care planning)    Low HDL (under 40)    Severe obesity (BMI 35.0-39. 9) with comorbidity ([address])    H/O mammogram    Gout    History of colon cancer        Current Outpatient Medications on File Prior to Visit

## 2023-08-08 NOTE — PROGRESS NOTES
1. \"Have you been to the ER, urgent care clinic since your last visit? Hospitalized since your last visit? \" No    2. \"Have you seen or consulted any other health care providers outside of the [address] [place] [address] since your last visit? \" Yes When: Derma and eye doctor       3. For patients aged 43-73: Has the patient had a colonoscopy / FIT/ Cologuard? Yes - no Care Gap present      If the patient is female:    4. For patients aged 43-66: Has the patient had a mammogram within the past 2 years? Yes - no Care Gap present      5. For patients aged 21-65: Has the patient had a pap smear? NA - based on age or sex       Chief Complaint   Patient presents with    Follow-up     Every 6 months      Pt is ok with scribe.

## 2023-08-08 NOTE — TELEPHONE ENCOUNTER
Called and left a VM for the patient. Needing to call back, [address] not due until 12/15/23 or after.

## 2023-08-09 LAB
ALBUMIN SERPL-MCNC: 4 G/DL (ref 3.5–5)
ALBUMIN/GLOB SERPL: 1.4 (ref 1.1–2.2)
ALP SERPL-CCNC: 76 U/L (ref 45–117)
ALT SERPL-CCNC: 17 U/L (ref 12–78)
ANION GAP SERPL CALC-SCNC: 5 MMOL/L (ref 5–15)
AST SERPL-CCNC: 21 U/L (ref 15–37)
BILIRUB SERPL-MCNC: 0.7 MG/DL (ref 0.2–1)
BUN SERPL-MCNC: 13 MG/DL (ref 6–20)
BUN/CREAT SERPL: 14 (ref 12–20)
CALCIUM SERPL-MCNC: 9.3 MG/DL (ref 8.5–10.1)
CHLORIDE SERPL-SCNC: 107 MMOL/L (ref 97–108)
CHOLEST SERPL-MCNC: 178 MG/DL
CO2 SERPL-SCNC: 28 MMOL/L (ref 21–32)
COMMENT:: NORMAL
CREAT SERPL-MCNC: 0.93 MG/DL (ref 0.55–1.02)
GLOBULIN SER CALC-MCNC: 2.8 G/DL (ref 2–4)
GLUCOSE SERPL-MCNC: 93 MG/DL (ref 65–100)
HDLC SERPL-MCNC: 37 MG/DL
HDLC SERPL: 4.8 (ref 0–5)
LDLC SERPL CALC-MCNC: 74.6 MG/DL (ref 0–100)
POTASSIUM SERPL-SCNC: 4.2 MMOL/L (ref 3.5–5.1)
PROT SERPL-MCNC: 6.8 G/DL (ref 6.4–8.2)
SODIUM SERPL-SCNC: 140 MMOL/L (ref 136–145)
SPECIMEN HOLD: NORMAL
TRIGL SERPL-MCNC: 332 MG/DL
URATE SERPL-MCNC: 6.3 MG/DL (ref 2.6–6)
VLDLC SERPL CALC-MCNC: 66.4 MG/DL

## 2023-08-11 DIAGNOSIS — E78.1 HYPERTRIGLYCERIDEMIA: Primary | ICD-10-CM

## 2023-08-11 RX ORDER — OMEGA-3-ACID ETHYL ESTERS 1 G/1
2 CAPSULE, LIQUID FILLED ORAL 2 TIMES DAILY
Qty: 60 CAPSULE | Refills: 3 | Status: SHIPPED | OUTPATIENT
Start: 2023-08-11

## 2023-08-29 ENCOUNTER — HOSPITAL ENCOUNTER (OUTPATIENT)
Facility: HOSPITAL | Age: 78
Discharge: HOME | End: 2023-09-01
Attending: INTERNAL MEDICINE
Payer: MEDICARE

## 2023-08-29 DIAGNOSIS — M85.88 OSTEOPENIA OF OTHER SITE: ICD-10-CM

## 2023-08-29 PROCEDURE — 77080 DXA BONE DENSITY AXIAL: CPT

## 2023-10-08 DIAGNOSIS — E79.0 HYPERURICEMIA WITHOUT SIGNS OF INFLAMMATORY ARTHRITIS AND TOPHACEOUS DISEASE: ICD-10-CM

## 2023-10-08 RX ORDER — ALLOPURINOL 100 MG/1
100 TABLET ORAL DAILY
Qty: 90 TABLET | Refills: 0 | Status: SHIPPED | OUTPATIENT
Start: 2023-10-08 | End: 2024-01-06

## 2023-11-03 DIAGNOSIS — I10 PRIMARY HYPERTENSION: ICD-10-CM

## 2023-11-03 RX ORDER — AMLODIPINE AND VALSARTAN 5; 160 MG/1; MG/1
1 TABLET ORAL DAILY
Qty: 90 TABLET | Refills: 0 | Status: SHIPPED | OUTPATIENT
Start: 2023-11-03

## 2024-03-10 SDOH — HEALTH STABILITY: PHYSICAL HEALTH: ON AVERAGE, HOW MANY DAYS PER WEEK DO YOU ENGAGE IN MODERATE TO STRENUOUS EXERCISE (LIKE A BRISK WALK)?: 2 DAYS

## 2024-03-10 SDOH — HEALTH STABILITY: PHYSICAL HEALTH: ON AVERAGE, HOW MANY MINUTES DO YOU ENGAGE IN EXERCISE AT THIS LEVEL?: 20 MIN

## 2024-03-10 ASSESSMENT — PATIENT HEALTH QUESTIONNAIRE - PHQ9
SUM OF ALL RESPONSES TO PHQ9 QUESTIONS 1 & 2: 0
2. FEELING DOWN, DEPRESSED OR HOPELESS: 0
SUM OF ALL RESPONSES TO PHQ QUESTIONS 1-9: 0
1. LITTLE INTEREST OR PLEASURE IN DOING THINGS: 0

## 2024-03-10 ASSESSMENT — LIFESTYLE VARIABLES
HOW OFTEN DO YOU HAVE A DRINK CONTAINING ALCOHOL: NEVER
HOW OFTEN DO YOU HAVE A DRINK CONTAINING ALCOHOL: 1
HOW MANY STANDARD DRINKS CONTAINING ALCOHOL DO YOU HAVE ON A TYPICAL DAY: 0
HOW OFTEN DO YOU HAVE SIX OR MORE DRINKS ON ONE OCCASION: 1
HOW MANY STANDARD DRINKS CONTAINING ALCOHOL DO YOU HAVE ON A TYPICAL DAY: PATIENT DOES NOT DRINK

## 2024-03-13 ENCOUNTER — OFFICE VISIT (OUTPATIENT)
Dept: PRIMARY CARE CLINIC | Facility: CLINIC | Age: 79
End: 2024-03-13
Payer: MEDICARE

## 2024-03-13 VITALS
TEMPERATURE: 97.3 F | HEIGHT: 62 IN | WEIGHT: 163.6 LBS | RESPIRATION RATE: 17 BRPM | HEART RATE: 72 BPM | SYSTOLIC BLOOD PRESSURE: 132 MMHG | DIASTOLIC BLOOD PRESSURE: 76 MMHG | OXYGEN SATURATION: 100 % | BODY MASS INDEX: 30.11 KG/M2

## 2024-03-13 DIAGNOSIS — I10 PRIMARY HYPERTENSION: ICD-10-CM

## 2024-03-13 DIAGNOSIS — Z00.00 MEDICARE ANNUAL WELLNESS VISIT, SUBSEQUENT: Primary | ICD-10-CM

## 2024-03-13 DIAGNOSIS — Z12.31 ENCOUNTER FOR SCREENING MAMMOGRAM FOR MALIGNANT NEOPLASM OF BREAST: ICD-10-CM

## 2024-03-13 DIAGNOSIS — E78.1 HYPERTRIGLYCERIDEMIA: ICD-10-CM

## 2024-03-13 DIAGNOSIS — E79.0 HYPERURICEMIA: ICD-10-CM

## 2024-03-13 DIAGNOSIS — K59.03 DRUG-INDUCED CONSTIPATION: ICD-10-CM

## 2024-03-13 DIAGNOSIS — Z23 NEED FOR TETANUS BOOSTER: ICD-10-CM

## 2024-03-13 PROCEDURE — 99214 OFFICE O/P EST MOD 30 MIN: CPT | Performed by: INTERNAL MEDICINE

## 2024-03-13 PROCEDURE — 1123F ACP DISCUSS/DSCN MKR DOCD: CPT | Performed by: INTERNAL MEDICINE

## 2024-03-13 PROCEDURE — G0439 PPPS, SUBSEQ VISIT: HCPCS | Performed by: INTERNAL MEDICINE

## 2024-03-13 PROCEDURE — 3075F SYST BP GE 130 - 139MM HG: CPT | Performed by: INTERNAL MEDICINE

## 2024-03-13 PROCEDURE — 3078F DIAST BP <80 MM HG: CPT | Performed by: INTERNAL MEDICINE

## 2024-03-13 RX ORDER — CHOLECALCIFEROL (VITAMIN D3) 1250 MCG
50000 CAPSULE ORAL DAILY
COMMUNITY

## 2024-03-13 RX ORDER — SENNA AND DOCUSATE SODIUM 50; 8.6 MG/1; MG/1
1 TABLET, FILM COATED ORAL DAILY
Qty: 90 TABLET | Refills: 0 | Status: SHIPPED | OUTPATIENT
Start: 2024-03-13 | End: 2024-06-11

## 2024-03-13 RX ORDER — AMLODIPINE AND VALSARTAN 5; 160 MG/1; MG/1
TABLET ORAL
Qty: 90 TABLET | Refills: 1 | Status: SHIPPED | OUTPATIENT
Start: 2024-03-13

## 2024-03-13 ASSESSMENT — ENCOUNTER SYMPTOMS
SHORTNESS OF BREATH: 0
ABDOMINAL PAIN: 0
COLOR CHANGE: 0
BACK PAIN: 0
COUGH: 0
SORE THROAT: 0
RHINORRHEA: 0
EYE DISCHARGE: 0
CHEST TIGHTNESS: 0
DIARRHEA: 0
CONSTIPATION: 0

## 2024-03-13 NOTE — PROGRESS NOTES
Health Decision Maker has been checked with the patient   Primary Decision Maker: Radha Antonio - Child - [phone]    Secondary Decision Maker: Yang Whittington - Child - [phone]     Patient has stated that the scribe can come in room    Chief Complaint   Patient presents with    Medicare AWV     Depression: Not at risk (3/10/2024)    PHQ-2     PHQ-2 Score: 0      /77 (Site: Left Upper Arm)   Pulse 72   Temp 97.3 °F (36.3 °C)   Resp 17   Ht 1.575 m (5' 2\")   Wt 74.2 kg (163 lb 9.6 oz)   SpO2 100%   BMI 29.92 kg/m²     \"Have you been to the ER, urgent care clinic since your last visit?  Hospitalized since your last visit?\"    NO    “Have you seen or consulted any other health care providers outside of [facility] since your last visit?”    YES - When: approximately 6 months ago.  Where and Why: Derm, Eye.           Specialist patient sees: Derm Eye    Chart reviewed: immunizations are documented.   Immunization History   Administered Date(s) Administered    COVID-19, MODERNA BLUE border, Primary or Immunocompromised, (age 12y+), IM, 100 mcg/0.5mL 01/22/2021, 02/19/2021    COVID-19, MODERNA Bivalent, (age 12y+), IM, 50 mcg/0.5 mL 11/03/2022    COVID-19, MODERNA Booster BLUE border, (age 18y+), IM, 50mcg/0.25mL 08/18/2021    Influenza Virus Vaccine 10/07/2012, 10/01/2013    Influenza, FLUAD, (age 65 y+), Adjuvanted, 0.5mL 11/14/2023    Influenza, High Dose (Fluzone 65 yrs and older) 10/01/2017    Pneumococcal, PCV-13, PREVNAR 13, (age 6w+), IM, 0.5mL 06/19/2019    Pneumococcal, PPSV23, PNEUMOVAX 23, (age 2y+), SC/IM, 0.5mL 01/07/2013    TDaP, ADACEL (age 10y-64y), BOOSTRIX (age 10y+), IM, 0.5mL 01/09/2013    Td vaccine (adult) 08/01/2002    Zoster Live (Zostavax) 11/21/2012    Zoster Recombinant (Shingrix) 03/12/2022, 05/26/2022

## 2024-03-13 NOTE — PROGRESS NOTES
Radha Whittington (:  1945) is a 78 y.o. female,Established patient, here for evaluation of the following chief complaint(s):  Medicare AWV       ASSESSMENT/PLAN:  1. Primary hypertension  BP is well controlled on amlodipine-valsartan 5-160 mg 2 tabs daily. Continue current medication(s). Recheck CMP for medication management.   -     amLODIPine-valsartan (EXFORGE) 5-160 MG per tablet; Take 2 tablets daily, Disp-90 tablet, R-1Normal sent to pharmacy.   -     Comprehensive Metabolic Panel; Future    2. Hypertriglyceridemia  Recheck lipid panel. She should continue Lovaza 2 g BID for now.  -     Lipid Panel; Future    3. Hyperuricemia  Well controlled on allopurinol 100 mg daily. Continue on current medication(s).   -     Uric Acid; Future    4. Drug-induced constipation  Most likely secondary to increase in amlodipine-valsartan. I prescribed Senokot-S 8.6-50 mg daily. Potential side effects were discussed. Also recommend staying well hydrated.   -     sennosides-docusate sodium (SENOKOT-S) 8.6-50 MG tablet; Take 1 tablet by mouth daily, Disp-90 tablet, R-0Normal sent to pharmacy.     5. Need for tetanus booster  I prescribed the Tdap vaccine for health maintenance.  -     Tetanus-Diphth-Acell Pertussis (BOOSTRIX) 5-2.5-18.5 LF-MCG/0.5 injection; Inject 0.5 mLs into the muscle once for 1 dose, Disp-0.5 mL, R-0Normal sent to pharmacy.     6. Encounter for screening mammogram for malignant neoplasm of breast  I ordered a screening mammogram for health maintenance.  -     NAVJOT DIGITAL SCREEN W OR WO CAD BILATERAL; Future    Subjective   SUBJECTIVE/OBJECTIVE:  HPI  The patient presents today for follow up on chronic conditions and Medicare Wellness Visit. She is fasting for blood work.     Her BP today is 135/77, 132/76 on manual repeat. At her last visit I started her on amlodipine-valsartan 5-160 mg daily. Her home BP at home was consistently in the high 140s/ so we then increased amlodipine-valsartan to 2

## 2024-03-13 NOTE — PROGRESS NOTES
Medicare Annual Wellness Visit    Radha Whittington is here for Medicare AWV         Subjective       Patient's complete Health Risk Assessment and screening values have been reviewed and are found in Flowsheets. The following problems were reviewed today and where indicated follow up appointments were made and/or referrals ordered.    No Positive Risk Factors identified today.                                  Objective   Vitals:    03/13/24 1416 03/13/24 1458   BP: 135/77 132/76   Site: Left Upper Arm Left Upper Arm   Position:  Sitting   Pulse: 72    Resp: 17    Temp: 97.3 °F (36.3 °C)    SpO2: 100%    Weight: 74.2 kg (163 lb 9.6 oz)    Height: 1.575 m (5' 2\")       Body mass index is 29.92 kg/m².             Allergies   Allergen Reactions    Ace Inhibitors Cough    Penicillins Hives    Terazosin      Other reaction(s): Unknown (comments)  Incont     Prior to Visit Medications    Medication Sig Taking? Authorizing Provider   Multiple Vitamins-Minerals (MULTIVITAMIN ADULT, MINERALS, PO) Take by mouth Yes Mone Martínez MD   vitamin D (VITAMIN D3) 37195 UNIT CAPS Take 1 capsule by mouth daily Yes Mone Martínez MD   sennosides-docusate sodium (SENOKOT-S) 8.6-50 MG tablet Take 1 tablet by mouth daily Yes Melissa Hunt MD   amLODIPine-valsartan (EXFORGE) 5-160 MG per tablet Take 2 tablets daily Yes Melissa Hunt MD   Tetanus-Diphth-Acell Pertussis (BOOSTRIX) 5-2.5-18.5 LF-MCG/0.5 injection Inject 0.5 mLs into the muscle once for 1 dose Yes Melissa Hunt MD   allopurinol (ZYLOPRIM) 100 MG tablet Take 1 tablet by mouth daily Yes Melissa Hunt MD   omega-3 acid ethyl esters (LOVAZA) 1 g capsule Take 2 capsules by mouth 2 times daily Yes Melissa Hunt MD   omeprazole (PRILOSEC) 20 MG delayed release capsule TAKE 1 CAPSULE BY MOUTH EVERY DAY Yes Melissa Hunt MD       CareTeam (Including outside providers/suppliers regularly involved in providing care):   Patient Care Team:  Melissa Hunt MD as PCP -  at home/No

## 2024-03-13 NOTE — PATIENT INSTRUCTIONS
every 1-2 years to screen for glaucoma; cataracts, macular degeneration, and other eye disorders.  A preventive dental visit is recommended every 6 months.  Try to get at least 150 minutes of exercise per week or 10,000 steps per day on a pedometer .  Order or download the FREE \"Exercise & Physical Activity: Your Everyday Guide\" from [employer]. Call 1-656.622.9664 or search [employer] online.  You need 1020-0035 mg of calcium and 1127-5188 IU of vitamin D per day. It is possible to meet your calcium requirement with diet alone, but a vitamin D supplement is usually necessary to meet this goal.  When exposed to the sun, use a sunscreen that protects against both UVA and UVB radiation with an SPF of 30 or greater. Reapply every 2 to 3 hours or after sweating, drying off with a towel, or swimming.  Always wear a seat belt when traveling in a car. Always wear a helmet when riding a bicycle or motorcycle.

## 2024-03-14 ENCOUNTER — PATIENT MESSAGE (OUTPATIENT)
Dept: PRIMARY CARE CLINIC | Facility: CLINIC | Age: 79
End: 2024-03-14

## 2024-03-14 LAB
ALBUMIN SERPL-MCNC: 4.2 G/DL (ref 3.5–5)
ALBUMIN/GLOB SERPL: 1.4 (ref 1.1–2.2)
ALP SERPL-CCNC: 73 U/L (ref 45–117)
ALT SERPL-CCNC: 14 U/L (ref 12–78)
ANION GAP SERPL CALC-SCNC: 5 MMOL/L (ref 5–15)
AST SERPL-CCNC: 19 U/L (ref 15–37)
BILIRUB SERPL-MCNC: 0.9 MG/DL (ref 0.2–1)
BUN SERPL-MCNC: 15 MG/DL (ref 6–20)
BUN/CREAT SERPL: 16 (ref 12–20)
CALCIUM SERPL-MCNC: 9.9 MG/DL (ref 8.5–10.1)
CHLORIDE SERPL-SCNC: 104 MMOL/L (ref 97–108)
CHOLEST SERPL-MCNC: 249 MG/DL
CO2 SERPL-SCNC: 29 MMOL/L (ref 21–32)
CREAT SERPL-MCNC: 0.91 MG/DL (ref 0.55–1.02)
GLOBULIN SER CALC-MCNC: 3 G/DL (ref 2–4)
GLUCOSE SERPL-MCNC: 105 MG/DL (ref 65–100)
HDLC SERPL-MCNC: 46 MG/DL
HDLC SERPL: 5.4 (ref 0–5)
LDLC SERPL CALC-MCNC: 147 MG/DL (ref 0–100)
POTASSIUM SERPL-SCNC: 3.7 MMOL/L (ref 3.5–5.1)
PROT SERPL-MCNC: 7.2 G/DL (ref 6.4–8.2)
SODIUM SERPL-SCNC: 138 MMOL/L (ref 136–145)
TRIGL SERPL-MCNC: 280 MG/DL
URATE SERPL-MCNC: 4.7 MG/DL (ref 2.6–6)
VLDLC SERPL CALC-MCNC: 56 MG/DL

## 2024-03-18 DIAGNOSIS — E79.0 HYPERURICEMIA WITHOUT SIGNS OF INFLAMMATORY ARTHRITIS AND TOPHACEOUS DISEASE: ICD-10-CM

## 2024-03-18 RX ORDER — AMLODIPINE AND VALSARTAN 10; 320 MG/1; MG/1
1 TABLET ORAL DAILY
Qty: 90 TABLET | Refills: 0 | Status: SHIPPED | OUTPATIENT
Start: 2024-03-18 | End: 2024-06-16

## 2024-03-18 NOTE — TELEPHONE ENCOUNTER
Bienvenido Garcia 3/18/2024 9:35 AM EDT        I understand I am to take one 160 mg of Valsartan in the morning and one 160 mg Valsartan at night.    The pharmacy has filled a 45 day supply of 160 mg. (Not what Dr Hunt told me. She said a 90 day supply )    I need a 90 day supply of 320 mg Valsartan. I will cut them in half and take half in the morning and half at night.    Please call the pharmacy and get the order corrected.    Thank you.

## 2024-03-19 RX ORDER — ALLOPURINOL 100 MG/1
100 TABLET ORAL DAILY
Qty: 90 TABLET | Refills: 0 | Status: SHIPPED | OUTPATIENT
Start: 2024-03-19

## 2024-04-01 DIAGNOSIS — I10 PRIMARY HYPERTENSION: ICD-10-CM

## 2024-04-01 RX ORDER — AMLODIPINE AND VALSARTAN 5; 160 MG/1; MG/1
TABLET ORAL
Qty: 90 TABLET | Refills: 1 | OUTPATIENT
Start: 2024-04-01

## 2024-04-02 ENCOUNTER — HOSPITAL ENCOUNTER (OUTPATIENT)
Facility: HOSPITAL | Age: 79
Discharge: HOME | End: 2024-04-05
Payer: MEDICARE

## 2024-04-02 DIAGNOSIS — Z12.31 ENCOUNTER FOR SCREENING MAMMOGRAM FOR MALIGNANT NEOPLASM OF BREAST: ICD-10-CM

## 2024-04-02 PROCEDURE — 77063 BREAST TOMOSYNTHESIS BI: CPT

## 2024-06-18 DIAGNOSIS — I10 BENIGN ESSENTIAL HYPERTENSION: Primary | ICD-10-CM

## 2024-06-19 ENCOUNTER — TELEPHONE (OUTPATIENT)
Dept: PRIMARY CARE CLINIC | Facility: CLINIC | Age: 79
End: 2024-06-19

## 2024-06-19 RX ORDER — AMLODIPINE AND VALSARTAN 10; 320 MG/1; MG/1
1 TABLET ORAL DAILY
Qty: 30 TABLET | Refills: 0 | OUTPATIENT
Start: 2024-06-19

## 2024-06-19 RX ORDER — AMLODIPINE AND VALSARTAN 10; 320 MG/1; MG/1
1 TABLET ORAL DAILY
Qty: 90 TABLET | Refills: 0 | Status: SHIPPED | OUTPATIENT
Start: 2024-06-19 | End: 2024-09-17

## 2024-07-20 DIAGNOSIS — E79.0 HYPERURICEMIA WITHOUT SIGNS OF INFLAMMATORY ARTHRITIS AND TOPHACEOUS DISEASE: ICD-10-CM

## 2024-07-22 RX ORDER — ALLOPURINOL 100 MG/1
100 TABLET ORAL DAILY
Qty: 90 TABLET | Refills: 0 | Status: SHIPPED | OUTPATIENT
Start: 2024-07-22

## 2024-10-08 RX ORDER — AMLODIPINE AND VALSARTAN 10; 320 MG/1; MG/1
1 TABLET ORAL DAILY
Qty: 90 TABLET | Refills: 0 | Status: SHIPPED | OUTPATIENT
Start: 2024-10-08

## 2024-11-07 DIAGNOSIS — E79.0 HYPERURICEMIA WITHOUT SIGNS OF INFLAMMATORY ARTHRITIS AND TOPHACEOUS DISEASE: ICD-10-CM

## 2024-11-07 DIAGNOSIS — K21.9 GASTRO-ESOPHAGEAL REFLUX DISEASE WITHOUT ESOPHAGITIS: ICD-10-CM

## 2024-11-07 RX ORDER — ALLOPURINOL 100 MG/1
100 TABLET ORAL DAILY
Qty: 90 TABLET | Refills: 0 | Status: SHIPPED | OUTPATIENT
Start: 2024-11-07

## 2024-12-17 DIAGNOSIS — I10 PRIMARY HYPERTENSION: ICD-10-CM

## 2024-12-17 DIAGNOSIS — E79.0 HYPERURICEMIA WITHOUT SIGNS OF INFLAMMATORY ARTHRITIS AND TOPHACEOUS DISEASE: ICD-10-CM

## 2024-12-18 RX ORDER — ALLOPURINOL 100 MG/1
100 TABLET ORAL DAILY
Qty: 90 TABLET | Refills: 0 | OUTPATIENT
Start: 2024-12-18

## 2024-12-18 RX ORDER — AMLODIPINE AND VALSARTAN 5; 160 MG/1; MG/1
1 TABLET ORAL DAILY
Qty: 90 TABLET | Refills: 0 | OUTPATIENT
Start: 2024-12-18

## 2024-12-18 RX ORDER — AMLODIPINE AND VALSARTAN 10; 320 MG/1; MG/1
1 TABLET ORAL DAILY
Qty: 90 TABLET | Refills: 0 | Status: SHIPPED | OUTPATIENT
Start: 2024-12-18

## 2024-12-21 DIAGNOSIS — E79.0 HYPERURICEMIA WITHOUT SIGNS OF INFLAMMATORY ARTHRITIS AND TOPHACEOUS DISEASE: ICD-10-CM

## 2024-12-22 RX ORDER — ALLOPURINOL 100 MG/1
100 TABLET ORAL DAILY
Qty: 90 TABLET | Refills: 0 | Status: SHIPPED | OUTPATIENT
Start: 2024-12-22

## 2025-03-15 SDOH — ECONOMIC STABILITY: TRANSPORTATION INSECURITY
IN THE PAST 12 MONTHS, HAS LACK OF TRANSPORTATION KEPT YOU FROM MEETINGS, WORK, OR FROM GETTING THINGS NEEDED FOR DAILY LIVING?: NO

## 2025-03-15 SDOH — ECONOMIC STABILITY: FOOD INSECURITY: WITHIN THE PAST 12 MONTHS, YOU WORRIED THAT YOUR FOOD WOULD RUN OUT BEFORE YOU GOT MONEY TO BUY MORE.: NEVER TRUE

## 2025-03-15 SDOH — ECONOMIC STABILITY: INCOME INSECURITY: IN THE LAST 12 MONTHS, WAS THERE A TIME WHEN YOU WERE NOT ABLE TO PAY THE MORTGAGE OR RENT ON TIME?: NO

## 2025-03-15 SDOH — ECONOMIC STABILITY: FOOD INSECURITY: WITHIN THE PAST 12 MONTHS, THE FOOD YOU BOUGHT JUST DIDN'T LAST AND YOU DIDN'T HAVE MONEY TO GET MORE.: NEVER TRUE

## 2025-03-15 SDOH — ECONOMIC STABILITY: TRANSPORTATION INSECURITY
IN THE PAST 12 MONTHS, HAS THE LACK OF TRANSPORTATION KEPT YOU FROM MEDICAL APPOINTMENTS OR FROM GETTING MEDICATIONS?: NO

## 2025-03-17 SDOH — HEALTH STABILITY: PHYSICAL HEALTH: ON AVERAGE, HOW MANY DAYS PER WEEK DO YOU ENGAGE IN MODERATE TO STRENUOUS EXERCISE (LIKE A BRISK WALK)?: 2 DAYS

## 2025-03-17 SDOH — HEALTH STABILITY: PHYSICAL HEALTH: ON AVERAGE, HOW MANY MINUTES DO YOU ENGAGE IN EXERCISE AT THIS LEVEL?: 30 MIN

## 2025-03-17 ASSESSMENT — LIFESTYLE VARIABLES
HOW OFTEN DO YOU HAVE A DRINK CONTAINING ALCOHOL: 1
HOW OFTEN DO YOU HAVE SIX OR MORE DRINKS ON ONE OCCASION: 1
HOW MANY STANDARD DRINKS CONTAINING ALCOHOL DO YOU HAVE ON A TYPICAL DAY: 0
HOW OFTEN DO YOU HAVE A DRINK CONTAINING ALCOHOL: NEVER
HOW MANY STANDARD DRINKS CONTAINING ALCOHOL DO YOU HAVE ON A TYPICAL DAY: PATIENT DOES NOT DRINK

## 2025-03-17 ASSESSMENT — PATIENT HEALTH QUESTIONNAIRE - PHQ9
1. LITTLE INTEREST OR PLEASURE IN DOING THINGS: NOT AT ALL
2. FEELING DOWN, DEPRESSED OR HOPELESS: NOT AT ALL
SUM OF ALL RESPONSES TO PHQ QUESTIONS 1-9: 0

## 2025-03-18 ENCOUNTER — OFFICE VISIT (OUTPATIENT)
Dept: PRIMARY CARE CLINIC | Facility: CLINIC | Age: 80
End: 2025-03-18
Payer: MEDICARE

## 2025-03-18 VITALS
TEMPERATURE: 97 F | OXYGEN SATURATION: 97 % | SYSTOLIC BLOOD PRESSURE: 132 MMHG | BODY MASS INDEX: 30.55 KG/M2 | RESPIRATION RATE: 18 BRPM | DIASTOLIC BLOOD PRESSURE: 75 MMHG | WEIGHT: 166 LBS | HEIGHT: 62 IN | HEART RATE: 61 BPM

## 2025-03-18 DIAGNOSIS — M1A.0710 IDIOPATHIC CHRONIC GOUT OF RIGHT FOOT WITHOUT TOPHUS: ICD-10-CM

## 2025-03-18 DIAGNOSIS — E78.2 MIXED HYPERLIPIDEMIA: ICD-10-CM

## 2025-03-18 DIAGNOSIS — I10 PRIMARY HYPERTENSION: ICD-10-CM

## 2025-03-18 DIAGNOSIS — Z00.00 MEDICARE ANNUAL WELLNESS VISIT, SUBSEQUENT: Primary | ICD-10-CM

## 2025-03-18 DIAGNOSIS — Z85.038 HISTORY OF COLON CANCER: ICD-10-CM

## 2025-03-18 LAB
ALBUMIN SERPL-MCNC: 3.9 G/DL (ref 3.5–5)
ALBUMIN/GLOB SERPL: 1.3 (ref 1.1–2.2)
ALP SERPL-CCNC: 69 U/L (ref 45–117)
ALT SERPL-CCNC: 12 U/L (ref 12–78)
ANION GAP SERPL CALC-SCNC: 6 MMOL/L (ref 2–12)
AST SERPL-CCNC: 18 U/L (ref 15–37)
BILIRUB SERPL-MCNC: 0.8 MG/DL (ref 0.2–1)
BUN SERPL-MCNC: 21 MG/DL (ref 6–20)
BUN/CREAT SERPL: 23 (ref 12–20)
CALCIUM SERPL-MCNC: 9.4 MG/DL (ref 8.5–10.1)
CHLORIDE SERPL-SCNC: 101 MMOL/L (ref 97–108)
CHOLEST SERPL-MCNC: 208 MG/DL
CO2 SERPL-SCNC: 29 MMOL/L (ref 21–32)
CREAT SERPL-MCNC: 0.9 MG/DL (ref 0.55–1.02)
ERYTHROCYTE [DISTWIDTH] IN BLOOD BY AUTOMATED COUNT: 12.8 % (ref 11.5–14.5)
GLOBULIN SER CALC-MCNC: 3.1 G/DL (ref 2–4)
GLUCOSE SERPL-MCNC: 98 MG/DL (ref 65–100)
HCT VFR BLD AUTO: 44.8 % (ref 35–47)
HDLC SERPL-MCNC: 47 MG/DL
HDLC SERPL: 4.4 (ref 0–5)
HGB BLD-MCNC: 15.6 G/DL (ref 11.5–16)
LDLC SERPL CALC-MCNC: 106.6 MG/DL (ref 0–100)
MCH RBC QN AUTO: 32 PG (ref 26–34)
MCHC RBC AUTO-ENTMCNC: 34.8 G/DL (ref 30–36.5)
MCV RBC AUTO: 91.8 FL (ref 80–99)
NRBC # BLD: 0 K/UL (ref 0–0.01)
NRBC BLD-RTO: 0 PER 100 WBC
PLATELET # BLD AUTO: 168 K/UL (ref 150–400)
PMV BLD AUTO: 9.9 FL (ref 8.9–12.9)
POTASSIUM SERPL-SCNC: 4.2 MMOL/L (ref 3.5–5.1)
PROT SERPL-MCNC: 7 G/DL (ref 6.4–8.2)
RBC # BLD AUTO: 4.88 M/UL (ref 3.8–5.2)
SODIUM SERPL-SCNC: 136 MMOL/L (ref 136–145)
TRIGL SERPL-MCNC: 272 MG/DL
URATE SERPL-MCNC: 5 MG/DL (ref 2.6–6)
VLDLC SERPL CALC-MCNC: 54.4 MG/DL
WBC # BLD AUTO: 5.3 K/UL (ref 3.6–11)

## 2025-03-18 PROCEDURE — 1036F TOBACCO NON-USER: CPT | Performed by: INTERNAL MEDICINE

## 2025-03-18 PROCEDURE — 99214 OFFICE O/P EST MOD 30 MIN: CPT | Performed by: INTERNAL MEDICINE

## 2025-03-18 PROCEDURE — 3075F SYST BP GE 130 - 139MM HG: CPT | Performed by: INTERNAL MEDICINE

## 2025-03-18 PROCEDURE — G8399 PT W/DXA RESULTS DOCUMENT: HCPCS | Performed by: INTERNAL MEDICINE

## 2025-03-18 PROCEDURE — 1123F ACP DISCUSS/DSCN MKR DOCD: CPT | Performed by: INTERNAL MEDICINE

## 2025-03-18 PROCEDURE — 1159F MED LIST DOCD IN RCRD: CPT | Performed by: INTERNAL MEDICINE

## 2025-03-18 PROCEDURE — 1090F PRES/ABSN URINE INCON ASSESS: CPT | Performed by: INTERNAL MEDICINE

## 2025-03-18 PROCEDURE — 1160F RVW MEDS BY RX/DR IN RCRD: CPT | Performed by: INTERNAL MEDICINE

## 2025-03-18 PROCEDURE — 1125F AMNT PAIN NOTED PAIN PRSNT: CPT | Performed by: INTERNAL MEDICINE

## 2025-03-18 PROCEDURE — 3078F DIAST BP <80 MM HG: CPT | Performed by: INTERNAL MEDICINE

## 2025-03-18 PROCEDURE — G8417 CALC BMI ABV UP PARAM F/U: HCPCS | Performed by: INTERNAL MEDICINE

## 2025-03-18 PROCEDURE — G8427 DOCREV CUR MEDS BY ELIG CLIN: HCPCS | Performed by: INTERNAL MEDICINE

## 2025-03-18 PROCEDURE — G0439 PPPS, SUBSEQ VISIT: HCPCS | Performed by: INTERNAL MEDICINE

## 2025-03-18 ASSESSMENT — ENCOUNTER SYMPTOMS
COUGH: 0
EYE DISCHARGE: 0
SHORTNESS OF BREATH: 0
ABDOMINAL PAIN: 0
CONSTIPATION: 0
DIARRHEA: 0
SORE THROAT: 0
CHEST TIGHTNESS: 0
COLOR CHANGE: 0
RHINORRHEA: 0
BACK PAIN: 0

## 2025-03-18 NOTE — PATIENT INSTRUCTIONS
Learning About Being Active as an Older Adult  Why is being active important as you get older?     Being active is one of the best things you can do for your health. And it's never too late to start. Being active--or getting active, if you aren't already--has definite benefits. It can:  Give you more energy,  Keep your mind sharp.  Improve balance to reduce your risk of falls.  Help you manage chronic illness with fewer medicines.  No matter how old you are, how fit you are, or what health problems you have, there is a form of activity that will work for you. And the more physical activity you can do, the better your overall health will be.  What kinds of activity can help you stay healthy?  Being more active will make your daily activities easier. Physical activity includes planned exercise and things you do in daily life. There are four types of activity:  Aerobic.  Doing aerobic activity makes your heart and lungs strong.  Includes walking, dancing, and gardening.  Aim for at least 2½ hours spread throughout the week.  It improves your energy and can help you sleep better.  Muscle-strengthening.  This type of activity can help maintain muscle and strengthen bones.  Includes climbing stairs, using resistance bands, and lifting or carrying heavy loads.  Aim for at least twice a week.  It can help protect the knees and other joints.  Stretching.  Stretching gives you better range of motion in joints and muscles.  Includes upper arm stretches, calf stretches, and gentle yoga.  Aim for at least twice a week, preferably after your muscles are warmed up from other activities.  It can help you function better in daily life.  Balancing.  This helps you stay coordinated and have good posture.  Includes heel-to-toe walking, leighann chi, and certain types of yoga.  Aim for at least 3 days a week.  It can reduce your risk of falling.  Even if you have a hard time meeting the recommendations, it's better to be more active

## 2025-03-20 ENCOUNTER — RESULTS FOLLOW-UP (OUTPATIENT)
Dept: PRIMARY CARE CLINIC | Facility: CLINIC | Age: 80
End: 2025-03-20

## 2025-04-07 DIAGNOSIS — I10 PRIMARY HYPERTENSION: ICD-10-CM

## 2025-04-07 RX ORDER — AMLODIPINE AND VALSARTAN 10; 320 MG/1; MG/1
1 TABLET ORAL DAILY
Qty: 30 TABLET | Refills: 0 | Status: SHIPPED | OUTPATIENT
Start: 2025-04-07 | End: 2025-04-12

## 2025-04-11 DIAGNOSIS — I10 PRIMARY HYPERTENSION: ICD-10-CM

## 2025-04-12 RX ORDER — AMLODIPINE AND VALSARTAN 10; 320 MG/1; MG/1
1 TABLET ORAL DAILY
Qty: 30 TABLET | Refills: 1 | Status: SHIPPED | OUTPATIENT
Start: 2025-04-12

## 2025-04-29 DIAGNOSIS — E79.0 HYPERURICEMIA WITHOUT SIGNS OF INFLAMMATORY ARTHRITIS AND TOPHACEOUS DISEASE: ICD-10-CM

## 2025-04-29 RX ORDER — ALLOPURINOL 100 MG/1
100 TABLET ORAL DAILY
Qty: 90 TABLET | Refills: 0 | Status: SHIPPED | OUTPATIENT
Start: 2025-04-29

## 2025-05-30 DIAGNOSIS — I10 PRIMARY HYPERTENSION: ICD-10-CM

## 2025-05-30 RX ORDER — AMLODIPINE AND VALSARTAN 10; 320 MG/1; MG/1
1 TABLET ORAL DAILY
Qty: 90 TABLET | Refills: 0 | Status: SHIPPED | OUTPATIENT
Start: 2025-05-30

## 2025-08-03 DIAGNOSIS — E79.0 HYPERURICEMIA WITHOUT SIGNS OF INFLAMMATORY ARTHRITIS AND TOPHACEOUS DISEASE: ICD-10-CM

## 2025-08-03 RX ORDER — ALLOPURINOL 100 MG/1
100 TABLET ORAL DAILY
Qty: 90 TABLET | Refills: 0 | Status: SHIPPED | OUTPATIENT
Start: 2025-08-03

## 2025-08-31 DIAGNOSIS — I10 PRIMARY HYPERTENSION: ICD-10-CM

## 2025-09-01 RX ORDER — AMLODIPINE AND VALSARTAN 10; 320 MG/1; MG/1
1 TABLET ORAL DAILY
Qty: 90 TABLET | Refills: 0 | Status: SHIPPED | OUTPATIENT
Start: 2025-09-01

## (undated) DEVICE — STAIN INDIA INK IN NACL 10ML --

## (undated) DEVICE — CONTAINER SPEC 20 ML LID NEUT BUFF FORMALIN 10 % POLYPR STS

## (undated) DEVICE — BW-412T DISP COMBO CLEANING BRUSH: Brand: SINGLE USE COMBINATION CLEANING BRUSH

## (undated) DEVICE — NEEDLE HYPO 18GA L1.5IN PNK S STL HUB POLYPR SHLD REG BVL

## (undated) DEVICE — AIRLIFE™ U/CONNECT-IT OXYGEN TUBING 7 FEET (2.1 M) CRUSH-RESISTANT OXYGEN TUBING, VINYL TIPPED: Brand: AIRLIFE™

## (undated) DEVICE — CANN NASAL O2 CAPNOGRAPHY AD -- FILTERLINE

## (undated) DEVICE — BAG SPEC BIOHZD LF 2MIL 6X10IN -- CONVERT TO ITEM 357326

## (undated) DEVICE — BAG BELONG PT PERS CLEAR HANDL

## (undated) DEVICE — SOLIDIFIER FLUID 3000 CC ABSORB

## (undated) DEVICE — Device

## (undated) DEVICE — SNARE ENDOSCP M L240CM W27MM SHTH DIA2.4MM CHN 2.8MM OVL

## (undated) DEVICE — ENDO CARRY-ON PROCEDURE KIT INCLUDES ENZYMATIC SPONGE, GAUZE, BIOHAZARD LABEL, TRAY, LUBRICANT, DIRTY SCOPE LABEL, WATER LABEL, TRAY, DRAWSTRING PAD, AND DEFENDO 4-PIECE KIT.: Brand: ENDO CARRY-ON PROCEDURE KIT

## (undated) DEVICE — KENDALL RADIOLUCENT FOAM MONITORING ELECTRODE -RECTANGULAR SHAPE: Brand: KENDALL

## (undated) DEVICE — SET ADMIN 16ML TBNG L100IN 2 Y INJ SITE IV PIGGY BK DISP

## (undated) DEVICE — NEEDLE SCLERO 23GA L4MM CATH L240CM CNTRST SHTH DIA1.8MM

## (undated) DEVICE — CATH IV AUTOGRD BC BLU 22GA 25 -- INSYTE

## (undated) DEVICE — 1200 GUARD II KIT W/5MM TUBE W/O VAC TUBE: Brand: GUARDIAN

## (undated) DEVICE — Z DISCONTINUED USE 2751540 TUBING IRRIG L10IN DISP PMP ENDOGATOR

## (undated) DEVICE — CONNECTOR TBNG AUX H2O JET DISP FOR OLY 160/180 SER

## (undated) DEVICE — QUILTED PREMIUM COMFORT UNDERPAD,EXTRA HEAVY: Brand: WINGS

## (undated) DEVICE — NEONATAL-ADULT SPO2 SENSOR: Brand: NELLCOR

## (undated) DEVICE — SYRINGE MED 20ML STD CLR PLAS LUERLOCK TIP N CTRL DISP

## (undated) DEVICE — (D)SYR 10ML 1/5ML GRAD NSAF -- PKGING CHANGE USE ITEM 338027

## (undated) DEVICE — TRAP SURG QUAD PARABOLA SLOT DSGN SFTY SCRN TRAPEASE

## (undated) DEVICE — FORCEPS BX L240CM JAW DIA2.8MM L CAP W/ NDL MIC MESH TOOTH

## (undated) DEVICE — SET EXTN TBNG L BOR 4 W STPCOCK ST 32IN PRIMING VOL 6ML

## (undated) DEVICE — KIT IV STRT W CHLORAPREP PD 1ML